# Patient Record
Sex: FEMALE | Race: WHITE | Employment: OTHER | URBAN - METROPOLITAN AREA
[De-identification: names, ages, dates, MRNs, and addresses within clinical notes are randomized per-mention and may not be internally consistent; named-entity substitution may affect disease eponyms.]

---

## 2021-05-20 ENCOUNTER — OFFICE VISIT (OUTPATIENT)
Dept: PRIMARY CARE CLINIC | Age: 76
End: 2021-05-20
Payer: MEDICARE

## 2021-05-20 DIAGNOSIS — Z01.818 PRE-OP EXAMINATION: Primary | ICD-10-CM

## 2021-05-20 LAB — SARS-COV-2: NOT DETECTED

## 2021-05-20 PROCEDURE — 99421 OL DIG E/M SVC 5-10 MIN: CPT | Performed by: NURSE PRACTITIONER

## 2021-05-20 RX ORDER — PROMETHAZINE HYDROCHLORIDE 25 MG/1
1 TABLET ORAL DAILY
Status: ON HOLD | COMMUNITY
End: 2021-05-28 | Stop reason: SDUPTHER

## 2021-05-20 RX ORDER — ASCORBIC ACID 500 MG
500 TABLET ORAL
COMMUNITY

## 2021-05-20 RX ORDER — CHLORAL HYDRATE 500 MG
1 CAPSULE ORAL
Status: ON HOLD | COMMUNITY
End: 2021-05-28 | Stop reason: SDUPTHER

## 2021-05-20 RX ORDER — ASPIRIN 81 MG/1
81 TABLET ORAL DAILY
Status: ON HOLD | COMMUNITY
End: 2021-05-28 | Stop reason: SDUPTHER

## 2021-05-20 RX ORDER — MELATONIN 10 MG
CAPSULE ORAL
COMMUNITY

## 2021-05-20 RX ORDER — OMEPRAZOLE 20 MG/1
20 CAPSULE, DELAYED RELEASE ORAL
COMMUNITY
Start: 2020-12-22

## 2021-05-20 RX ORDER — SIMVASTATIN 40 MG
40 TABLET ORAL
COMMUNITY
Start: 2020-12-22

## 2021-05-20 RX ORDER — CELECOXIB 200 MG/1
200 CAPSULE ORAL 2 TIMES DAILY
Status: ON HOLD | COMMUNITY
End: 2021-05-28 | Stop reason: HOSPADM

## 2021-05-20 NOTE — PROGRESS NOTES
[facility] patients having surgery or anesthesia are required to be Covid tested OR to have been vaccinated at least 14 days prior to your procedure. It is very important to return our call to [phone] to notify the staff of your last vaccination date or you will be required to complete Covid testing within the 5-6 days prior to surgery & quarantine. We recommend coming to the [facility] flu tent to complete. It is open Monday-Friday 8am-3pm currently. If you go outside [facility] you need to ensure you have a PCR Covid test and fax results to PreAdmission Testing at [phone]. PRIOR TO PROCEDURE DATE:        1. PLEASE FOLLOW ANY  GUIDELINES/ INSTRUCTIONS PRIOR TO YOUR PROCEDURE AS ADVISED BY YOUR SURGEON. 2. Arrange for someone to drive you home and be with you for the first 24 hours after discharge for your safety after your procedure for which you received sedation. Ensure it is someone we can share information with regarding your discharge. 3. You must contact your surgeon for instructions IF:   You are taking any blood thinners, aspirin, anti-inflammatory or vitamin E.   There is a change in your physical condition such as a cold, fever, rash, cuts, sores or any other infection, especially near your surgical site. 4. Do not drink alcohol the day before or day of your procedure. 5. A Pre-op History and Physical for surgery MUST be completed by your Physician or Urgent Care within 30 days of your procedure date. Please bring a copy with you on the day of your procedure and along with any other testing performed. THE DAY OF YOUR PROCEDURE:  1. Follow instructions for ARRIVAL TIME as DIRECTED BY YOUR SURGEON. 2. Enter the MAIN entrance from [address] and follow the signs to the free [facility] or [facility] parking (offered free of charge 6am-5pm).             3. Enter the Main Entrance of the hospital (do not enter from the lower level of the parking garage). Upon entrance, check in with the  at the main desk on your left. If no one is available at the desk, proceed into the [facility] Waiting Room and go through the door directly into the [facility]. There is a Check-in desk ACROSS from Room 5 (marked with a sign hanging from the ceiling). The phone number for the surgery center is [phone]. 4. Please call [phone] option #2 option #2 if you have not been preregistered yet. On the day of your procedure bring your insurance card and photo ID. You will be registered at your bedside once brought back to your room. 5. DO NOT EAT ANYTHING eight hours prior to your arrival for surgery. May have 8 ounces of water 4 hours prior to your arrival for surgery. NOTE: ALL Gastric, Bariatric and Bowel surgery patients MUST follow their surgeon's instructions. 6. MEDICATIONS    Take the following medications with a SMALL sip of water:    Use your usual dose of inhalers the morning of surgery. BRING your rescue inhaler with you to hospital.    Anesthesia does NOT want you to take insulin the morning of surgery. They will control your blood sugar while you are at the hospital. Please contact your ordering physician for instructions regarding your insulin the night before your procedure. If you have an insulin pump, please keep it set on basal rate. 7. Do not swallow water when brushing teeth. No gum, candy, mints or ice chips. Refrain from smoking or at least decrease the amount. 8. Dress in loose, comfortable clothing appropriate for redressing after your procedure. Do not wear jewelry (including body piercings), make-up (especially NO eye make-up), fingernail polish (NO toenail polish if foot/leg surgery), lotion, powders or metal hairclips. 9. Dentures, glasses, or contacts will need to be removed before your procedure.  Bring cases for your glasses, contacts, dentures, or hearing aids to protect them while you are in surgery. 10. If you use a CPAP, please bring it with you on the day of your procedure. 11. We recommend that valuable personal  belongings such as cash, cell phones, e-tablets or jewelry, be left at home during your stay. The hospital will not be responsible for valuables that are not secured in the hospital safe. However, if your insurance requires a co-pay, you may want to bring a method of payment, i.e. Check or credit card, if you wish to pay your co-pay the day of surgery. 12. If you are to stay overnight, you may bring a bag with personal items. Please have any large items you may need brought in by your family after your arrival to your hospital room. 15. If you have a Living Will or Durable Power of , please bring a copy on the day of your procedure. 15. With your permission, one family member may accompany you while you are being prepared for surgery. Once you are ready, additional family members may join you. HOW WE KEEP YOU SAFE and WORK TO PREVENT SURGICAL SITE INFECTIONS:  1. Health care workers should always check your ID bracelet to verify your name and birth date. You will be asked many times to state your name, date of birth, and allergies. 2. Health care workers should always clean their hands with soap or alcohol gel before providing care to you. It is okay to ask anyone if they cleaned their hands before they touch you. 3. You will be actively involved in verifying the type of procedure you are having and ensuring the correct surgical site. This will be confirmed multiple times prior to your procedure. Do NOT fatemeh your surgery site UNLESS instructed to by your surgeon. 4. Do not shave or wax for 72 hours prior to procedure near your operative site. Shaving with a razor can irritate your skin and make it easier to develop an infection.  On the day of your procedure, any hair that needs to be removed near the surgical site will be clipped by a healthcare worker using a special clippers designed to avoid skin irritation. 5. When you are in the operating room, your surgical site will be cleansed with a special soap, and in most cases, you will be given an antibiotic before the surgery begins. What to expect AFTER YOUR PROCEDURE:  1. Immediately following your procedure, your will be taken to the PACU for the first phase of your recovery. Your nurse will help you recover from any potential side effects of anesthesia, such as extreme drowsiness, changes in your vital signs or breathing patterns. Nausea, headache, muscle aches, or sore throat may also occur after anesthesia. Your nurse will help you manage these potential side effects. 2. For comfort and safety, arrange to have someone at home with you for the first 24 hours after discharge. 3. You and your family will be given written instructions about your diet, activity, dressing care, medications, and return visits. 4. Once at home, should issues with nausea, pain, or bleeding occur, or should you notice any signs of infection, you should call your surgeon. 5. Always clean your hands before and after caring for your wound. Do not let your family touch your surgery site without cleaning their hands. 6. Narcotic pain medications can cause significant constipation. You may want to add a stool softener to your postoperative medication schedule or speak to your surgeon on how best to manage this SIDE EFFECT. SPECIAL INSTRUCTIONS     Thank you for allowing us to care for you. We strive to exceed your expectations in the delivery of care and service provided to you and your family. If you need to contact the [place] [address] staff for any reason, please call us at [phone]    Instructions reviewed with patient during preadmission testing phone interview.   Craig Doss RN.5/20/2021 .3:40 PM      ADDITIONAL EDUCATIONAL INFORMATION REVIEWED PER PHONE WITH YOU AND/OR YOUR FAMILY:  Yes Hibiclens® Bathing Instructions

## 2021-05-25 ENCOUNTER — ANESTHESIA EVENT (OUTPATIENT)
Dept: OPERATING ROOM | Age: 76
DRG: 454 | End: 2021-05-25
Payer: MEDICARE

## 2021-05-25 NOTE — PROGRESS NOTES
[facility] / [facility]) [address] [facility], [address]    Acknowledgment of Informed Consent for Surgical or Medical Procedure and Sedation  I agree to allow doctor(s) Niki Cohen and  Amber Santa and his/her associates or assistants, including residents and/or other qualified medical practitioner to perform the following medical treatment or procedure and to administer or direct the administration of sedation as necessary:  Procedure(s): L3-4, L4-5 ANTERIOR LUMBAR INTERBODY FUSION WITH POSTERIOR LUMBAR L3-4. L4-5 DECOMPRESSION FUSION FIXATION USING PARAMEDIAN TECHNIQUES    My doctor has explained the following regarding the proposed procedure:   the explanation of the procedure   the benefits of the procedure   the potential problems that might occur during recuperation   the risks and side effects of the procedure which could include but are not limited to severe blood loss, infection, stroke or death   the benefits, risks and side effect of alternative procedures including the consequences of declining this procedure or any alternative procedures   the likelihood of achieving satisfactory results. I acknowledge no guarantee or assurance has been made to me regarding the results. I understand that during the course of this treatment/procedure, unforeseen conditions can occur which require an additional or different procedure. I agree to allow my physician or assistants to perform such extension of the original procedure as they may find necessary. I understand that sedation will often result in temporary impairment of memory and fine motor skills and that sedation can occasionally progress to a state of deep sedation or general anesthesia.     I understand the risks of anesthesia for surgery include, but are not limited to, sore throat, hoarseness, injury to face, mouth, or teeth; nausea; headache; injury to blood vessels or nerves; death, brain damage, or paralysis. I understand that if I have a Limitation of Treatment order in effect during my hospitalization, the order may or may not be in effect during this procedure. I give my doctor permission to give me blood or blood products. I understand that there are risks with receiving blood such as hepatitis, AIDS, fever, or allergic reaction. I acknowledge that the risks, benefits, and alternatives of this treatment have been explained to me and that no express or implied warranty has been given by the hospital, any blood bank, or any person or entity as to the blood or blood components transfused. At the discretion of my doctor, I agree to allow observers, equipment/product representatives and allow photographing, and/or televising of the procedure, provided my name or identity is maintained confidentially. I agree the hospital may dispose of or use for scientific or educational purposes any tissue, fluid, or body parts which may be removed.     ________________________________Date________Time______ am/pm  ([place] [facility])  Patient or Signature of Closest Relative or Legal Guardian    ________________________________Date________Time______am/pm      Page 1 of  1  Witness

## 2021-05-26 ENCOUNTER — ANESTHESIA (OUTPATIENT)
Dept: OPERATING ROOM | Age: 76
DRG: 454 | End: 2021-05-26
Payer: MEDICARE

## 2021-05-26 ENCOUNTER — APPOINTMENT (OUTPATIENT)
Dept: GENERAL RADIOLOGY | Age: 76
DRG: 454 | End: 2021-05-26
Attending: NEUROLOGICAL SURGERY
Payer: MEDICARE

## 2021-05-26 ENCOUNTER — HOSPITAL ENCOUNTER (INPATIENT)
Age: 76
LOS: 3 days | Discharge: HOME | DRG: 454 | End: 2021-05-29
Attending: NEUROLOGICAL SURGERY | Admitting: NEUROLOGICAL SURGERY
Payer: MEDICARE

## 2021-05-26 VITALS
RESPIRATION RATE: 6 BRPM | TEMPERATURE: 94.8 F | DIASTOLIC BLOOD PRESSURE: 82 MMHG | SYSTOLIC BLOOD PRESSURE: 162 MMHG | OXYGEN SATURATION: 100 %

## 2021-05-26 DIAGNOSIS — M48.062 LUMBAR STENOSIS WITH NEUROGENIC CLAUDICATION: Primary | ICD-10-CM

## 2021-05-26 DIAGNOSIS — Z98.1 S/P LUMBAR FUSION: ICD-10-CM

## 2021-05-26 PROCEDURE — C1713 ANCHOR/SCREW BN/BN,TIS/BN: HCPCS | Performed by: NEUROLOGICAL SURGERY

## 2021-05-26 PROCEDURE — 2500000003 HC RX 250 WO HCPCS: Performed by: NURSE ANESTHETIST, CERTIFIED REGISTERED

## 2021-05-26 PROCEDURE — 2580000003 HC RX 258: Performed by: NEUROLOGICAL SURGERY

## 2021-05-26 PROCEDURE — 2580000003 HC RX 258: Performed by: NURSE ANESTHETIST, CERTIFIED REGISTERED

## 2021-05-26 PROCEDURE — 3600000004 HC SURGERY LEVEL 4 BASE: Performed by: NEUROLOGICAL SURGERY

## 2021-05-26 PROCEDURE — 6370000000 HC RX 637 (ALT 250 FOR IP): Performed by: ANESTHESIOLOGY

## 2021-05-26 PROCEDURE — 7100000001 HC PACU RECOVERY - ADDTL 15 MIN: Performed by: NEUROLOGICAL SURGERY

## 2021-05-26 PROCEDURE — 3209999900 FLUORO FOR SURGICAL PROCEDURES

## 2021-05-26 PROCEDURE — 2580000003 HC RX 258: Performed by: PHYSICIAN ASSISTANT

## 2021-05-26 PROCEDURE — 3700000001 HC ADD 15 MINUTES (ANESTHESIA): Performed by: NEUROLOGICAL SURGERY

## 2021-05-26 PROCEDURE — 0SB20ZZ EXCISION OF LUMBAR VERTEBRAL DISC, OPEN APPROACH: ICD-10-PCS | Performed by: NEUROLOGICAL SURGERY

## 2021-05-26 PROCEDURE — 87641 MR-STAPH DNA AMP PROBE: CPT

## 2021-05-26 PROCEDURE — 6370000000 HC RX 637 (ALT 250 FOR IP): Performed by: PHYSICIAN ASSISTANT

## 2021-05-26 PROCEDURE — 2500000003 HC RX 250 WO HCPCS: Performed by: NEUROLOGICAL SURGERY

## 2021-05-26 PROCEDURE — 3700000000 HC ANESTHESIA ATTENDED CARE: Performed by: NEUROLOGICAL SURGERY

## 2021-05-26 PROCEDURE — 6360000002 HC RX W HCPCS: Performed by: NEUROLOGICAL SURGERY

## 2021-05-26 PROCEDURE — 0SG1071 FUSION OF 2 OR MORE LUMBAR VERTEBRAL JOINTS WITH AUTOLOGOUS TISSUE SUBSTITUTE, POSTERIOR APPROACH, POSTERIOR COLUMN, OPEN APPROACH: ICD-10-PCS | Performed by: NEUROLOGICAL SURGERY

## 2021-05-26 PROCEDURE — 6360000002 HC RX W HCPCS: Performed by: PHYSICIAN ASSISTANT

## 2021-05-26 PROCEDURE — 7100000000 HC PACU RECOVERY - FIRST 15 MIN: Performed by: NEUROLOGICAL SURGERY

## 2021-05-26 PROCEDURE — 01NB0ZZ RELEASE LUMBAR NERVE, OPEN APPROACH: ICD-10-PCS | Performed by: NEUROLOGICAL SURGERY

## 2021-05-26 PROCEDURE — 2780000010 HC IMPLANT OTHER: Performed by: NEUROLOGICAL SURGERY

## 2021-05-26 PROCEDURE — 3600000014 HC SURGERY LEVEL 4 ADDTL 15MIN: Performed by: NEUROLOGICAL SURGERY

## 2021-05-26 PROCEDURE — 07DR0ZZ EXTRACTION OF ILIAC BONE MARROW, OPEN APPROACH: ICD-10-PCS | Performed by: NEUROLOGICAL SURGERY

## 2021-05-26 PROCEDURE — 6360000002 HC RX W HCPCS: Performed by: NURSE ANESTHETIST, CERTIFIED REGISTERED

## 2021-05-26 PROCEDURE — 6360000002 HC RX W HCPCS: Performed by: ANESTHESIOLOGY

## 2021-05-26 PROCEDURE — 2580000003 HC RX 258: Performed by: ANESTHESIOLOGY

## 2021-05-26 PROCEDURE — 0SG10A0 FUSION OF 2 OR MORE LUMBAR VERTEBRAL JOINTS WITH INTERBODY FUSION DEVICE, ANTERIOR APPROACH, ANTERIOR COLUMN, OPEN APPROACH: ICD-10-PCS | Performed by: NEUROLOGICAL SURGERY

## 2021-05-26 PROCEDURE — C9290 INJ, BUPIVACAINE LIPOSOME: HCPCS | Performed by: NEUROLOGICAL SURGERY

## 2021-05-26 PROCEDURE — 2709999900 HC NON-CHARGEABLE SUPPLY: Performed by: NEUROLOGICAL SURGERY

## 2021-05-26 PROCEDURE — 72100 X-RAY EXAM L-S SPINE 2/3 VWS: CPT

## 2021-05-26 PROCEDURE — 1200000000 HC SEMI PRIVATE

## 2021-05-26 PROCEDURE — 2720000010 HC SURG SUPPLY STERILE: Performed by: NEUROLOGICAL SURGERY

## 2021-05-26 PROCEDURE — C9359 IMPLNT,BON VOID FILLER-PUTTY: HCPCS | Performed by: NEUROLOGICAL SURGERY

## 2021-05-26 DEVICE — ROD SPNL L70MM DIA5.5MM POST PEDCL TI LORDOSED VIPER 2: Type: IMPLANTABLE DEVICE | Site: SPINE LUMBAR | Status: FUNCTIONAL

## 2021-05-26 DEVICE — DBX PUTTY, 5CC
Type: IMPLANTABLE DEVICE | Site: SPINE LUMBAR | Status: FUNCTIONAL
Brand: DBX®

## 2021-05-26 DEVICE — BONE GRAFT KIT 7510200 INFUSE SMALL
Type: IMPLANTABLE DEVICE | Site: SPINE LUMBAR | Status: FUNCTIONAL
Brand: INFUSE® BONE GRAFT

## 2021-05-26 DEVICE — BOWTI ANTERIOR BUTTRESS STAPLE BONE SCREW 5.70 X 25MM
Type: IMPLANTABLE DEVICE | Site: SPINE LUMBAR | Status: FUNCTIONAL
Brand: BOWTI

## 2021-05-26 DEVICE — WASHER SPINAL TITANIUM 16 MM 6 MM: Type: IMPLANTABLE DEVICE | Site: SPINE LUMBAR | Status: FUNCTIONAL

## 2021-05-26 DEVICE — Z DUP USE 2254824 SCREW SET SINGLE INNIE 2 MINIMALLY INVASIVE SURG TI VIPER 2: Type: IMPLANTABLE DEVICE | Site: SPINE LUMBAR | Status: FUNCTIONAL

## 2021-05-26 DEVICE — IMPLANTABLE DEVICE: Type: IMPLANTABLE DEVICE | Site: SPINE LUMBAR | Status: FUNCTIONAL

## 2021-05-26 DEVICE — BONE GRFT SUB L 12.5CC BIOACTIVE GLS MTRX: Type: IMPLANTABLE DEVICE | Site: SPINE LUMBAR | Status: FUNCTIONAL

## 2021-05-26 DEVICE — BONE GRAFT KIT 7510400 INFUSE MEDIUM
Type: IMPLANTABLE DEVICE | Site: SPINE LUMBAR | Status: FUNCTIONAL
Brand: INFUSE® BONE GRAFT

## 2021-05-26 DEVICE — SCREW SPNL L45MM OD6MM CORT FIX TI POLYAX FEN EXT TAB MIS: Type: IMPLANTABLE DEVICE | Site: SPINE LUMBAR | Status: FUNCTIONAL

## 2021-05-26 RX ORDER — PROPOFOL 10 MG/ML
INJECTION, EMULSION INTRAVENOUS CONTINUOUS PRN
Status: DISCONTINUED | OUTPATIENT
Start: 2021-05-26 | End: 2021-05-26 | Stop reason: SDUPTHER

## 2021-05-26 RX ORDER — SODIUM CHLORIDE 0.9 % (FLUSH) 0.9 %
10 SYRINGE (ML) INJECTION EVERY 12 HOURS SCHEDULED
Status: DISCONTINUED | OUTPATIENT
Start: 2021-05-26 | End: 2021-05-26 | Stop reason: HOSPADM

## 2021-05-26 RX ORDER — MEPERIDINE HYDROCHLORIDE 25 MG/ML
12.5 INJECTION INTRAMUSCULAR; INTRAVENOUS; SUBCUTANEOUS EVERY 5 MIN PRN
Status: DISCONTINUED | OUTPATIENT
Start: 2021-05-26 | End: 2021-05-26 | Stop reason: HOSPADM

## 2021-05-26 RX ORDER — SODIUM PHOSPHATE, DIBASIC AND SODIUM PHOSPHATE, MONOBASIC 7; 19 G/133ML; G/133ML
1 ENEMA RECTAL DAILY PRN
Status: DISCONTINUED | OUTPATIENT
Start: 2021-05-26 | End: 2021-05-29 | Stop reason: HOSPADM

## 2021-05-26 RX ORDER — HYDROMORPHONE HCL 110MG/55ML
PATIENT CONTROLLED ANALGESIA SYRINGE INTRAVENOUS PRN
Status: DISCONTINUED | OUTPATIENT
Start: 2021-05-26 | End: 2021-05-26 | Stop reason: SDUPTHER

## 2021-05-26 RX ORDER — FENTANYL CITRATE 50 UG/ML
INJECTION, SOLUTION INTRAMUSCULAR; INTRAVENOUS PRN
Status: DISCONTINUED | OUTPATIENT
Start: 2021-05-26 | End: 2021-05-26 | Stop reason: SDUPTHER

## 2021-05-26 RX ORDER — MAGNESIUM SULFATE HEPTAHYDRATE 500 MG/ML
INJECTION, SOLUTION INTRAMUSCULAR; INTRAVENOUS PRN
Status: DISCONTINUED | OUTPATIENT
Start: 2021-05-26 | End: 2021-05-26 | Stop reason: SDUPTHER

## 2021-05-26 RX ORDER — ROCURONIUM BROMIDE 10 MG/ML
INJECTION, SOLUTION INTRAVENOUS PRN
Status: DISCONTINUED | OUTPATIENT
Start: 2021-05-26 | End: 2021-05-26 | Stop reason: SDUPTHER

## 2021-05-26 RX ORDER — OXYCODONE HYDROCHLORIDE AND ACETAMINOPHEN 5; 325 MG/1; MG/1
1-2 TABLET ORAL EVERY 4 HOURS PRN
Qty: 84 TABLET | Refills: 0 | Status: SHIPPED | OUTPATIENT
Start: 2021-05-26 | End: 2021-06-02

## 2021-05-26 RX ORDER — VANCOMYCIN HYDROCHLORIDE 1 G/20ML
INJECTION, POWDER, LYOPHILIZED, FOR SOLUTION INTRAVENOUS PRN
Status: DISCONTINUED | OUTPATIENT
Start: 2021-05-26 | End: 2021-05-26 | Stop reason: HOSPADM

## 2021-05-26 RX ORDER — LIDOCAINE HCL/PF 100 MG/5ML
SYRINGE (ML) INJECTION PRN
Status: DISCONTINUED | OUTPATIENT
Start: 2021-05-26 | End: 2021-05-26 | Stop reason: SDUPTHER

## 2021-05-26 RX ORDER — CEFAZOLIN SODIUM 2 G/50ML
2000 SOLUTION INTRAVENOUS EVERY 8 HOURS
Status: COMPLETED | OUTPATIENT
Start: 2021-05-26 | End: 2021-05-27

## 2021-05-26 RX ORDER — ATORVASTATIN CALCIUM 20 MG/1
20 TABLET, FILM COATED ORAL DAILY
Status: DISCONTINUED | OUTPATIENT
Start: 2021-05-26 | End: 2021-05-29 | Stop reason: HOSPADM

## 2021-05-26 RX ORDER — SENNA AND DOCUSATE SODIUM 50; 8.6 MG/1; MG/1
1 TABLET, FILM COATED ORAL 2 TIMES DAILY
Status: DISCONTINUED | OUTPATIENT
Start: 2021-05-26 | End: 2021-05-29 | Stop reason: HOSPADM

## 2021-05-26 RX ORDER — SCOLOPAMINE TRANSDERMAL SYSTEM 1 MG/1
1 PATCH, EXTENDED RELEASE TRANSDERMAL ONCE
Status: COMPLETED | OUTPATIENT
Start: 2021-05-26 | End: 2021-05-29

## 2021-05-26 RX ORDER — REMIFENTANIL HYDROCHLORIDE 1 MG/ML
INJECTION, POWDER, LYOPHILIZED, FOR SOLUTION INTRAVENOUS CONTINUOUS PRN
Status: DISCONTINUED | OUTPATIENT
Start: 2021-05-26 | End: 2021-05-26 | Stop reason: SDUPTHER

## 2021-05-26 RX ORDER — PHENYLEPHRINE HYDROCHLORIDE 10 MG/ML
INJECTION INTRAVENOUS PRN
Status: DISCONTINUED | OUTPATIENT
Start: 2021-05-26 | End: 2021-05-26 | Stop reason: SDUPTHER

## 2021-05-26 RX ORDER — ONDANSETRON 2 MG/ML
4 INJECTION INTRAMUSCULAR; INTRAVENOUS EVERY 6 HOURS PRN
Status: DISCONTINUED | OUTPATIENT
Start: 2021-05-26 | End: 2021-05-29 | Stop reason: HOSPADM

## 2021-05-26 RX ORDER — GLYCOPYRROLATE 1 MG/5 ML
SYRINGE (ML) INTRAVENOUS PRN
Status: DISCONTINUED | OUTPATIENT
Start: 2021-05-26 | End: 2021-05-26 | Stop reason: SDUPTHER

## 2021-05-26 RX ORDER — DEXAMETHASONE SODIUM PHOSPHATE 4 MG/ML
INJECTION, SOLUTION INTRA-ARTICULAR; INTRALESIONAL; INTRAMUSCULAR; INTRAVENOUS; SOFT TISSUE PRN
Status: DISCONTINUED | OUTPATIENT
Start: 2021-05-26 | End: 2021-05-26 | Stop reason: SDUPTHER

## 2021-05-26 RX ORDER — ONDANSETRON 4 MG/1
4 TABLET, ORALLY DISINTEGRATING ORAL 3 TIMES DAILY PRN
Qty: 21 TABLET | Refills: 0 | Status: SHIPPED | OUTPATIENT
Start: 2021-05-26 | End: 2021-06-26

## 2021-05-26 RX ORDER — HEPARIN SODIUM 5000 [USP'U]/ML
5000 INJECTION, SOLUTION INTRAVENOUS; SUBCUTANEOUS EVERY 8 HOURS SCHEDULED
Status: DISCONTINUED | OUTPATIENT
Start: 2021-05-27 | End: 2021-05-29 | Stop reason: HOSPADM

## 2021-05-26 RX ORDER — SODIUM CHLORIDE 0.9 % (FLUSH) 0.9 %
10 SYRINGE (ML) INJECTION PRN
Status: DISCONTINUED | OUTPATIENT
Start: 2021-05-26 | End: 2021-05-29 | Stop reason: HOSPADM

## 2021-05-26 RX ORDER — ACETAMINOPHEN 325 MG/1
650 TABLET ORAL EVERY 6 HOURS
Status: DISCONTINUED | OUTPATIENT
Start: 2021-05-26 | End: 2021-05-27

## 2021-05-26 RX ORDER — OXYCODONE HYDROCHLORIDE 5 MG/1
5 TABLET ORAL EVERY 4 HOURS PRN
Status: DISCONTINUED | OUTPATIENT
Start: 2021-05-26 | End: 2021-05-29 | Stop reason: HOSPADM

## 2021-05-26 RX ORDER — PROMETHAZINE HYDROCHLORIDE 12.5 MG/1
12.5 TABLET ORAL EVERY 6 HOURS PRN
Status: DISCONTINUED | OUTPATIENT
Start: 2021-05-26 | End: 2021-05-29 | Stop reason: HOSPADM

## 2021-05-26 RX ORDER — PROMETHAZINE HYDROCHLORIDE 25 MG/ML
6.25 INJECTION, SOLUTION INTRAMUSCULAR; INTRAVENOUS
Status: DISCONTINUED | OUTPATIENT
Start: 2021-05-26 | End: 2021-05-26 | Stop reason: HOSPADM

## 2021-05-26 RX ORDER — LABETALOL HYDROCHLORIDE 5 MG/ML
5 INJECTION, SOLUTION INTRAVENOUS EVERY 10 MIN PRN
Status: DISCONTINUED | OUTPATIENT
Start: 2021-05-26 | End: 2021-05-26 | Stop reason: HOSPADM

## 2021-05-26 RX ORDER — DOCUSATE SODIUM 100 MG/1
100 CAPSULE, LIQUID FILLED ORAL 2 TIMES DAILY
Qty: 60 CAPSULE | Refills: 0 | Status: SHIPPED | OUTPATIENT
Start: 2021-05-26 | End: 2021-06-25

## 2021-05-26 RX ORDER — PROPOFOL 10 MG/ML
INJECTION, EMULSION INTRAVENOUS PRN
Status: DISCONTINUED | OUTPATIENT
Start: 2021-05-26 | End: 2021-05-26 | Stop reason: SDUPTHER

## 2021-05-26 RX ORDER — SODIUM CHLORIDE, SODIUM LACTATE, POTASSIUM CHLORIDE, CALCIUM CHLORIDE 600; 310; 30; 20 MG/100ML; MG/100ML; MG/100ML; MG/100ML
INJECTION, SOLUTION INTRAVENOUS CONTINUOUS
Status: DISCONTINUED | OUTPATIENT
Start: 2021-05-26 | End: 2021-05-26

## 2021-05-26 RX ORDER — CEFAZOLIN SODIUM 2 G/50ML
2000 SOLUTION INTRAVENOUS ONCE
Status: COMPLETED | OUTPATIENT
Start: 2021-05-26 | End: 2021-05-26

## 2021-05-26 RX ORDER — LIDOCAINE HYDROCHLORIDE ANHYDROUS AND DEXTROSE MONOHYDRATE .4; 5 G/100ML; G/100ML
INJECTION, SOLUTION INTRAVENOUS CONTINUOUS PRN
Status: DISCONTINUED | OUTPATIENT
Start: 2021-05-26 | End: 2021-05-26 | Stop reason: SDUPTHER

## 2021-05-26 RX ORDER — FENTANYL CITRATE 50 UG/ML
25 INJECTION, SOLUTION INTRAMUSCULAR; INTRAVENOUS EVERY 5 MIN PRN
Status: DISCONTINUED | OUTPATIENT
Start: 2021-05-26 | End: 2021-05-26 | Stop reason: HOSPADM

## 2021-05-26 RX ORDER — HEPARIN SODIUM 5000 [USP'U]/.5ML
5000 INJECTION, SOLUTION INTRAVENOUS; SUBCUTANEOUS EVERY 8 HOURS
Status: DISCONTINUED | OUTPATIENT
Start: 2021-05-27 | End: 2021-05-26 | Stop reason: CLARIF

## 2021-05-26 RX ORDER — FENTANYL CITRATE 50 UG/ML
50 INJECTION, SOLUTION INTRAMUSCULAR; INTRAVENOUS EVERY 5 MIN PRN
Status: DISCONTINUED | OUTPATIENT
Start: 2021-05-26 | End: 2021-05-26 | Stop reason: HOSPADM

## 2021-05-26 RX ORDER — MIDAZOLAM HYDROCHLORIDE 1 MG/ML
INJECTION INTRAMUSCULAR; INTRAVENOUS PRN
Status: DISCONTINUED | OUTPATIENT
Start: 2021-05-26 | End: 2021-05-26 | Stop reason: SDUPTHER

## 2021-05-26 RX ORDER — MECOBALAMIN 5000 MCG
10 TABLET,DISINTEGRATING ORAL NIGHTLY PRN
Status: DISCONTINUED | OUTPATIENT
Start: 2021-05-26 | End: 2021-05-29 | Stop reason: HOSPADM

## 2021-05-26 RX ORDER — METHOCARBAMOL 750 MG/1
750 TABLET, FILM COATED ORAL 4 TIMES DAILY PRN
Status: DISCONTINUED | OUTPATIENT
Start: 2021-05-27 | End: 2021-05-27

## 2021-05-26 RX ORDER — ONDANSETRON 2 MG/ML
4 INJECTION INTRAMUSCULAR; INTRAVENOUS
Status: DISCONTINUED | OUTPATIENT
Start: 2021-05-26 | End: 2021-05-26 | Stop reason: HOSPADM

## 2021-05-26 RX ORDER — OXYCODONE HYDROCHLORIDE 5 MG/1
10 TABLET ORAL EVERY 4 HOURS PRN
Status: DISCONTINUED | OUTPATIENT
Start: 2021-05-26 | End: 2021-05-29 | Stop reason: HOSPADM

## 2021-05-26 RX ORDER — PANTOPRAZOLE SODIUM 40 MG/1
40 TABLET, DELAYED RELEASE ORAL
Status: DISCONTINUED | OUTPATIENT
Start: 2021-05-27 | End: 2021-05-29 | Stop reason: HOSPADM

## 2021-05-26 RX ORDER — POLYETHYLENE GLYCOL 3350 17 G/17G
17 POWDER, FOR SOLUTION ORAL DAILY PRN
Status: DISCONTINUED | OUTPATIENT
Start: 2021-05-26 | End: 2021-05-27

## 2021-05-26 RX ORDER — OXYCODONE HYDROCHLORIDE AND ACETAMINOPHEN 5; 325 MG/1; MG/1
1 TABLET ORAL
Status: DISCONTINUED | OUTPATIENT
Start: 2021-05-26 | End: 2021-05-26 | Stop reason: HOSPADM

## 2021-05-26 RX ORDER — SODIUM CHLORIDE 0.9 % (FLUSH) 0.9 %
10 SYRINGE (ML) INJECTION EVERY 12 HOURS SCHEDULED
Status: DISCONTINUED | OUTPATIENT
Start: 2021-05-26 | End: 2021-05-29 | Stop reason: HOSPADM

## 2021-05-26 RX ORDER — SODIUM CHLORIDE, SODIUM LACTATE, POTASSIUM CHLORIDE, CALCIUM CHLORIDE 600; 310; 30; 20 MG/100ML; MG/100ML; MG/100ML; MG/100ML
INJECTION, SOLUTION INTRAVENOUS CONTINUOUS
Status: DISCONTINUED | OUTPATIENT
Start: 2021-05-26 | End: 2021-05-27

## 2021-05-26 RX ORDER — SUCCINYLCHOLINE/SOD CL,ISO/PF 200MG/10ML
SYRINGE (ML) INTRAVENOUS PRN
Status: DISCONTINUED | OUTPATIENT
Start: 2021-05-26 | End: 2021-05-26 | Stop reason: SDUPTHER

## 2021-05-26 RX ORDER — METHOCARBAMOL 500 MG/1
500-1000 TABLET, FILM COATED ORAL EVERY 6 HOURS PRN
Qty: 80 TABLET | Refills: 1 | Status: SHIPPED | OUTPATIENT
Start: 2021-05-26

## 2021-05-26 RX ORDER — HYDRALAZINE HYDROCHLORIDE 20 MG/ML
5 INJECTION INTRAMUSCULAR; INTRAVENOUS EVERY 10 MIN PRN
Status: DISCONTINUED | OUTPATIENT
Start: 2021-05-26 | End: 2021-05-26 | Stop reason: HOSPADM

## 2021-05-26 RX ORDER — SODIUM CHLORIDE 9 MG/ML
25 INJECTION, SOLUTION INTRAVENOUS PRN
Status: DISCONTINUED | OUTPATIENT
Start: 2021-05-26 | End: 2021-05-26 | Stop reason: HOSPADM

## 2021-05-26 RX ORDER — SODIUM CHLORIDE 0.9 % (FLUSH) 0.9 %
10 SYRINGE (ML) INJECTION PRN
Status: DISCONTINUED | OUTPATIENT
Start: 2021-05-26 | End: 2021-05-26 | Stop reason: HOSPADM

## 2021-05-26 RX ORDER — SODIUM CHLORIDE 9 MG/ML
25 INJECTION, SOLUTION INTRAVENOUS PRN
Status: DISCONTINUED | OUTPATIENT
Start: 2021-05-26 | End: 2021-05-29 | Stop reason: HOSPADM

## 2021-05-26 RX ORDER — SODIUM CHLORIDE, SODIUM LACTATE, POTASSIUM CHLORIDE, CALCIUM CHLORIDE 600; 310; 30; 20 MG/100ML; MG/100ML; MG/100ML; MG/100ML
INJECTION, SOLUTION INTRAVENOUS CONTINUOUS PRN
Status: DISCONTINUED | OUTPATIENT
Start: 2021-05-26 | End: 2021-05-26 | Stop reason: SDUPTHER

## 2021-05-26 RX ORDER — ONDANSETRON 2 MG/ML
INJECTION INTRAMUSCULAR; INTRAVENOUS PRN
Status: DISCONTINUED | OUTPATIENT
Start: 2021-05-26 | End: 2021-05-26 | Stop reason: SDUPTHER

## 2021-05-26 RX ADMIN — ROCURONIUM BROMIDE 50 MG: 10 INJECTION INTRAVENOUS at 13:56

## 2021-05-26 RX ADMIN — PROPOFOL 250 MG: 10 INJECTION, EMULSION INTRAVENOUS at 13:40

## 2021-05-26 RX ADMIN — CEFAZOLIN SODIUM 2000 MG: 2 SOLUTION INTRAVENOUS at 23:20

## 2021-05-26 RX ADMIN — ACETAMINOPHEN 650 MG: 325 TABLET ORAL at 21:29

## 2021-05-26 RX ADMIN — METHOCARBAMOL 1000 MG: 100 INJECTION, SOLUTION INTRAMUSCULAR; INTRAVENOUS at 17:40

## 2021-05-26 RX ADMIN — HYDROMORPHONE HYDROCHLORIDE 0.25 MG: 1 INJECTION, SOLUTION INTRAMUSCULAR; INTRAVENOUS; SUBCUTANEOUS at 23:18

## 2021-05-26 RX ADMIN — PHENYLEPHRINE HYDROCHLORIDE 80 MCG: 10 INJECTION INTRAVENOUS at 14:30

## 2021-05-26 RX ADMIN — Medication 0.2 MG: at 13:30

## 2021-05-26 RX ADMIN — HYDROMORPHONE HYDROCHLORIDE 1 MG: 2 INJECTION, SOLUTION INTRAMUSCULAR; INTRAVENOUS; SUBCUTANEOUS at 14:18

## 2021-05-26 RX ADMIN — ONDANSETRON 4 MG: 2 INJECTION INTRAMUSCULAR; INTRAVENOUS at 17:04

## 2021-05-26 RX ADMIN — DOCUSATE SODIUM 50 MG AND SENNOSIDES 8.6 MG 1 TABLET: 8.6; 5 TABLET, FILM COATED ORAL at 21:25

## 2021-05-26 RX ADMIN — Medication 10 MG: at 21:25

## 2021-05-26 RX ADMIN — PROPOFOL 100 MCG/KG/MIN: 10 INJECTION, EMULSION INTRAVENOUS at 13:40

## 2021-05-26 RX ADMIN — ATORVASTATIN CALCIUM 20 MG: 20 TABLET, FILM COATED ORAL at 21:25

## 2021-05-26 RX ADMIN — Medication 0.2 MG: at 15:51

## 2021-05-26 RX ADMIN — SUGAMMADEX 200 MG: 100 INJECTION, SOLUTION INTRAVENOUS at 17:04

## 2021-05-26 RX ADMIN — FENTANYL CITRATE 25 MCG: 50 INJECTION INTRAMUSCULAR; INTRAVENOUS at 18:17

## 2021-05-26 RX ADMIN — CEFAZOLIN SODIUM 2000 MG: 2 SOLUTION INTRAVENOUS at 13:56

## 2021-05-26 RX ADMIN — MIDAZOLAM HYDROCHLORIDE 2 MG: 2 INJECTION, SOLUTION INTRAMUSCULAR; INTRAVENOUS at 13:30

## 2021-05-26 RX ADMIN — DEXAMETHASONE SODIUM PHOSPHATE 8 MG: 4 INJECTION, SOLUTION INTRAMUSCULAR; INTRAVENOUS at 14:06

## 2021-05-26 RX ADMIN — FENTANYL CITRATE 50 MCG: 50 INJECTION INTRAMUSCULAR; INTRAVENOUS at 17:43

## 2021-05-26 RX ADMIN — Medication 100 MG: at 13:40

## 2021-05-26 RX ADMIN — HYDROMORPHONE HYDROCHLORIDE 0.6 MG: 2 INJECTION, SOLUTION INTRAMUSCULAR; INTRAVENOUS; SUBCUTANEOUS at 14:02

## 2021-05-26 RX ADMIN — Medication 120 MG: at 13:40

## 2021-05-26 RX ADMIN — SODIUM CHLORIDE, POTASSIUM CHLORIDE, SODIUM LACTATE AND CALCIUM CHLORIDE: 600; 310; 30; 20 INJECTION, SOLUTION INTRAVENOUS at 23:08

## 2021-05-26 RX ADMIN — Medication 10 ML: at 21:26

## 2021-05-26 RX ADMIN — MAGNESIUM SULFATE HEPTAHYDRATE 2 G: 500 INJECTION, SOLUTION INTRAMUSCULAR; INTRAVENOUS at 14:12

## 2021-05-26 RX ADMIN — SODIUM CHLORIDE, SODIUM LACTATE, POTASSIUM CHLORIDE, AND CALCIUM CHLORIDE: .6; .31; .03; .02 INJECTION, SOLUTION INTRAVENOUS at 15:21

## 2021-05-26 RX ADMIN — ROCURONIUM BROMIDE 30 MG: 10 INJECTION INTRAVENOUS at 15:05

## 2021-05-26 RX ADMIN — PROPOFOL 50 MG: 10 INJECTION, EMULSION INTRAVENOUS at 15:40

## 2021-05-26 RX ADMIN — HYDROMORPHONE HYDROCHLORIDE 0.4 MG: 2 INJECTION, SOLUTION INTRAMUSCULAR; INTRAVENOUS; SUBCUTANEOUS at 14:12

## 2021-05-26 RX ADMIN — SODIUM CHLORIDE, POTASSIUM CHLORIDE, SODIUM LACTATE AND CALCIUM CHLORIDE: 600; 310; 30; 20 INJECTION, SOLUTION INTRAVENOUS at 12:23

## 2021-05-26 RX ADMIN — LIDOCAINE HYDROCHLORIDE ANHYDROUS AND DEXTROSE MONOHYDRATE 2 MG/MIN: .4; 5 INJECTION, SOLUTION INTRAVENOUS at 13:40

## 2021-05-26 RX ADMIN — FENTANYL CITRATE 100 MCG: 50 INJECTION, SOLUTION INTRAMUSCULAR; INTRAVENOUS at 13:36

## 2021-05-26 RX ADMIN — HYDRALAZINE HYDROCHLORIDE 5 MG: 20 INJECTION INTRAMUSCULAR; INTRAVENOUS at 17:44

## 2021-05-26 RX ADMIN — PHENYLEPHRINE HYDROCHLORIDE 20 MCG/MIN: 10 INJECTION INTRAVENOUS at 14:39

## 2021-05-26 RX ADMIN — OXYCODONE 10 MG: 5 TABLET ORAL at 21:25

## 2021-05-26 RX ADMIN — HYDRALAZINE HYDROCHLORIDE 5 MG: 20 INJECTION INTRAMUSCULAR; INTRAVENOUS at 17:58

## 2021-05-26 RX ADMIN — SODIUM CHLORIDE, POTASSIUM CHLORIDE, SODIUM LACTATE AND CALCIUM CHLORIDE: 600; 310; 30; 20 INJECTION, SOLUTION INTRAVENOUS at 17:56

## 2021-05-26 RX ADMIN — REMIFENTANIL HYDROCHLORIDE 0.11 MCG/KG/MIN: 1 INJECTION, POWDER, LYOPHILIZED, FOR SOLUTION INTRAVENOUS at 13:40

## 2021-05-26 RX ADMIN — SODIUM CHLORIDE, SODIUM LACTATE, POTASSIUM CHLORIDE, AND CALCIUM CHLORIDE: .6; .31; .03; .02 INJECTION, SOLUTION INTRAVENOUS at 13:35

## 2021-05-26 ASSESSMENT — PULMONARY FUNCTION TESTS
PIF_VALUE: 22
PIF_VALUE: 20
PIF_VALUE: 19
PIF_VALUE: 19
PIF_VALUE: 26
PIF_VALUE: 22
PIF_VALUE: 0
PIF_VALUE: 2
PIF_VALUE: 20
PIF_VALUE: 20
PIF_VALUE: 26
PIF_VALUE: 17
PIF_VALUE: 19
PIF_VALUE: 19
PIF_VALUE: 21
PIF_VALUE: 20
PIF_VALUE: 19
PIF_VALUE: 26
PIF_VALUE: 20
PIF_VALUE: 20
PIF_VALUE: 22
PIF_VALUE: 25
PIF_VALUE: 20
PIF_VALUE: 20
PIF_VALUE: 22
PIF_VALUE: 24
PIF_VALUE: 19
PIF_VALUE: 24
PIF_VALUE: 20
PIF_VALUE: 20
PIF_VALUE: 19
PIF_VALUE: 26
PIF_VALUE: 20
PIF_VALUE: 23
PIF_VALUE: 20
PIF_VALUE: 27
PIF_VALUE: 20
PIF_VALUE: 24
PIF_VALUE: 20
PIF_VALUE: 20
PIF_VALUE: 19
PIF_VALUE: 19
PIF_VALUE: 20
PIF_VALUE: 20
PIF_VALUE: 19
PIF_VALUE: 20
PIF_VALUE: 20
PIF_VALUE: 26
PIF_VALUE: 25
PIF_VALUE: 20
PIF_VALUE: 20
PIF_VALUE: 19
PIF_VALUE: 20
PIF_VALUE: 26
PIF_VALUE: 25
PIF_VALUE: 19
PIF_VALUE: 25
PIF_VALUE: 19
PIF_VALUE: 20
PIF_VALUE: 5
PIF_VALUE: 18
PIF_VALUE: 32
PIF_VALUE: 20
PIF_VALUE: 27
PIF_VALUE: 24
PIF_VALUE: 20
PIF_VALUE: 27
PIF_VALUE: 26
PIF_VALUE: 25
PIF_VALUE: 20
PIF_VALUE: 24
PIF_VALUE: 1
PIF_VALUE: 19
PIF_VALUE: 21
PIF_VALUE: 19
PIF_VALUE: 20
PIF_VALUE: 19
PIF_VALUE: 24
PIF_VALUE: 19
PIF_VALUE: 20
PIF_VALUE: 19
PIF_VALUE: 25
PIF_VALUE: 25
PIF_VALUE: 20
PIF_VALUE: 19
PIF_VALUE: 20
PIF_VALUE: 19
PIF_VALUE: 20
PIF_VALUE: 19
PIF_VALUE: 22
PIF_VALUE: 23
PIF_VALUE: 20
PIF_VALUE: 0
PIF_VALUE: 20
PIF_VALUE: 26
PIF_VALUE: 19
PIF_VALUE: 25
PIF_VALUE: 0
PIF_VALUE: 20
PIF_VALUE: 20
PIF_VALUE: 19
PIF_VALUE: 26
PIF_VALUE: 19
PIF_VALUE: 19
PIF_VALUE: 20
PIF_VALUE: 20
PIF_VALUE: 25
PIF_VALUE: 19
PIF_VALUE: 20
PIF_VALUE: 24
PIF_VALUE: 19
PIF_VALUE: 21
PIF_VALUE: 20
PIF_VALUE: 25
PIF_VALUE: 27

## 2021-05-26 ASSESSMENT — PAIN DESCRIPTION - ONSET: ONSET: AWAKENED FROM SLEEP

## 2021-05-26 ASSESSMENT — PAIN SCALES - GENERAL
PAINLEVEL_OUTOF10: 5
PAINLEVEL_OUTOF10: 4
PAINLEVEL_OUTOF10: 5
PAINLEVEL_OUTOF10: 1
PAINLEVEL_OUTOF10: 7
PAINLEVEL_OUTOF10: 0
PAINLEVEL_OUTOF10: 7
PAINLEVEL_OUTOF10: 2

## 2021-05-26 ASSESSMENT — PAIN DESCRIPTION - DESCRIPTORS
DESCRIPTORS: ACHING;THROBBING
DESCRIPTORS: ACHING

## 2021-05-26 ASSESSMENT — PAIN DESCRIPTION - LOCATION
LOCATION: KNEE
LOCATION: BACK;ABDOMEN

## 2021-05-26 ASSESSMENT — PAIN DESCRIPTION - ORIENTATION
ORIENTATION: MID
ORIENTATION: RIGHT

## 2021-05-26 ASSESSMENT — PAIN DESCRIPTION - FREQUENCY
FREQUENCY: CONTINUOUS
FREQUENCY: CONTINUOUS

## 2021-05-26 ASSESSMENT — PAIN DESCRIPTION - PAIN TYPE
TYPE: SURGICAL PAIN;ACUTE PAIN
TYPE: CHRONIC PAIN

## 2021-05-26 ASSESSMENT — PAIN - FUNCTIONAL ASSESSMENT: PAIN_FUNCTIONAL_ASSESSMENT: ACTIVITIES ARE NOT PREVENTED

## 2021-05-26 ASSESSMENT — PAIN DESCRIPTION - PROGRESSION: CLINICAL_PROGRESSION: NOT CHANGED

## 2021-05-26 NOTE — ANESTHESIA PRE PROCEDURE
Department of Anesthesiology  Preprocedure Note       Name:  Cecilia Mercer   Age:  70 y.o.  :  1945                                          MRN:  [id number]         Date:  2021      Surgeon: Andrea Egan):  MD Angel Torres MD    Procedure: Procedure(s):  L3-4, L4-5 ANTERIOR LUMBAR INTERBODY FUSION WITH POSTERIOR LUMBAR L3-4. L4-5 DECOMPRESSION FUSION FIXATION USING PARAMEDIAN TECHNIQUES  . Medications prior to admission:   Prior to Admission medications    Medication Sig Start Date End Date Taking?  Authorizing Provider   ascorbic acid (VITAMIN C) 500 MG tablet Take 500 mg by mouth   Yes Historical Provider, MD   melatonin 10 MG CAPS capsule Take by mouth   Yes Historical Provider, MD   omeprazole (PRILOSEC) 20 MG delayed release capsule Take 20 mg by mouth 20  Yes Historical Provider, MD   simvastatin (ZOCOR) 40 MG tablet Take 40 mg by mouth 20  Yes Historical Provider, MD   Psyllium (METAMUCIL PO) Take by mouth   Yes Historical Provider, MD   aspirin 81 MG EC tablet Take 81 mg by mouth daily    Historical Provider, MD   celecoxib (CELEBREX) 200 MG capsule Take 200 mg by mouth 2 times daily    Historical Provider, MD   Multiple Vitamins-Iron TABS Take 1 tablet by mouth daily    Historical Provider, MD   Omega-3 Fatty Acids (FISH OIL) 1000 MG CAPS Take 1 g by mouth    Historical Provider, MD   Acetaminophen (TYLENOL PO) Take by mouth    Historical Provider, MD       Current medications:    Current Facility-Administered Medications   Medication Dose Route Frequency Provider Last Rate Last Admin    ceFAZolin (ANCEF) 2000 mg in dextrose 3 % 50 mL IVPB (duplex)  2,000 mg Intravenous Once Marcos Pickett MD        lactated ringers infusion   Intravenous Continuous Sonya José DO        sodium chloride flush 0.9 % injection 10 mL  10 mL Intravenous 2 times per day Sonya José DO        sodium chloride flush 0.9 % injection 10 mL  10 mL Intravenous PRN Re Taylor DO        0.9 % sodium chloride infusion  25 mL Intravenous PRN Re Taylor DO           Allergies:  No Known Allergies    Problem List:  There is no problem list on file for this patient. Past Medical History:        Diagnosis Date    GERD (gastroesophageal reflux disease)     Hyperlipidemia     Spinal stenosis        Past Surgical History:        Procedure Laterality Date    BACK SURGERY  01/22/2020    CARPAL TUNNEL RELEASE      HYSTERECTOMY      NASAL SEPTUM SURGERY  11/2020       Social History:    Social History     Tobacco Use    Smoking status: Never Smoker    Smokeless tobacco: Never Used   Substance Use Topics    Alcohol use: Not Currently                                Counseling given: Not Answered      Vital Signs (Current):   Vitals:    05/20/21 1534 05/26/21 1123   BP:  (!) 164/76   Pulse:  80   Resp:  16   Temp:  98.2 °F (36.8 °C)   TempSrc:  Oral   SpO2:  99%   Weight: 213 lb (96.6 kg) 213 lb (96.6 kg)   Height: 5' 6\" (1.676 m) 5' 6\" (1.676 m)                                              BP Readings from Last 3 Encounters:   05/26/21 (!) 164/76       NPO Status: Time of last liquid consumption: 0700                        Time of last solid consumption: 0700 (sip with meds and last solid meal was at 1800 on 5/25/21)                        Date of last liquid consumption: 05/26/21                        Date of last solid food consumption: 05/26/21    BMI:   Wt Readings from Last 3 Encounters:   05/26/21 213 lb (96.6 kg)     Body mass index is 34.38 kg/m².     CBC: No results found for: WBC, RBC, HGB, HCT, MCV, RDW, PLT    CMP:   Lab Results   Component Value Date     09/30/2011    K 4.3 09/30/2011     09/30/2011    CO2 28 09/30/2011    BUN 18 09/30/2011    CREATININE 0.8 09/30/2011    GFRAA >60 09/30/2011    AGRATIO 1.3 09/30/2011    GLUCOSE 92 09/30/2011    PROT 7.5 09/30/2011    CALCIUM 9.6 09/30/2011    BILITOT 0.70 09/30/2011    ALKPHOS 84 09/30/2011    AST 21 09/30/2011    ALT 15 09/30/2011       POC Tests: No results for input(s): POCGLU, POCNA, POCK, POCCL, POCBUN, POCHEMO, POCHCT in the last 72 hours. Coags: No results found for: PROTIME, INR, APTT    HCG (If Applicable): No results found for: PREGTESTUR, PREGSERUM, HCG, HCGQUANT     ABGs: No results found for: PHART, PO2ART, WEJ0DIJ, SJZ8HXG, BEART, T7VCWRMD     Type & Screen (If Applicable):  No results found for: LABABO, LABRH    Drug/Infectious Status (If Applicable):  No results found for: HIV, HEPCAB    COVID-19 Screening (If Applicable):   Lab Results   Component Value Date    COVID19 Not Detected 05/20/2021           Anesthesia Evaluation  Patient summary reviewed and Nursing notes reviewed no history of anesthetic complications:   Airway: Mallampati: II  TM distance: <3 FB   Neck ROM: full  Mouth opening: > = 3 FB Dental: normal exam         Pulmonary:Negative Pulmonary ROS and normal exam  breath sounds clear to auscultation                             Cardiovascular:  Exercise tolerance: good (>4 METS),         NYHA Classification: I  ECG reviewed  Rhythm: regular  Rate: normal           Beta Blocker:  Not on Beta Blocker         Neuro/Psych:   Negative Neuro/Psych ROS              GI/Hepatic/Renal:   (+) GERD: well controlled,           Endo/Other: Negative Endo/Other ROS                    Abdominal:           Vascular: negative vascular ROS. Anesthesia Plan      general     ASA 2       Induction: intravenous. MIPS: Postoperative opioids intended. Anesthetic plan and risks discussed with patient. Use of blood products discussed with patient whom consented to blood products. Plan discussed with CRNA.     Attending anesthesiologist reviewed and agrees with Pre Eval content              Olegario Dove DO   5/26/2021

## 2021-05-26 NOTE — PROGRESS NOTES
Patient arrived to PACU post L3-4, L4-5 ANTERIOR LUMBAR INTERBODY FUSION WITH POSTERIOR LUMBAR L3-4. L4-5 DECOMPRESSION FUSION FIXATION USING PARAMEDIAN TECHNIQUES by Dr. Ronn Pennington. Patient arrived drowsy but responsive on 4L NC. Patient is moving both extremities independently but not following commands a this time. Patient denies pain. Patient hooked up to PACU monitors. Report received from [facility] 27, per report patient had some pauses on the EKG with PAC's,  HR dropped to 49. Patient was given robinul, HR on arrival is SR 80 BPM. B/L lower back incisions and midline abd incision CD&I with surgical glue.

## 2021-05-26 NOTE — H&P
Kenyetta Mackey    [id number]    [facility] Same Day Surgery Update H & P  Department of General Surgery   Surgical Service   Pre-operative History and Physical  Last H & P within the last 30 days. DIAGNOSIS:   Spondylolisthesis of lumbar region [M43.16]  Lumbar stenosis with neurogenic claudication [M48.062]    Procedure(s):  L3-4, L4-5 ANTERIOR LUMBAR INTERBODY FUSION WITH POSTERIOR LUMBAR L3-4. L4-5 DECOMPRESSION FUSION FIXATION USING PARAMEDIAN TECHNIQUES  . HISTORY OF PRESENT ILLNESS:   Patient with chronic lumbar pain and left posterior hip pain. The symptoms have been recalcitrant to conservative treatment and the patient presents today for the above procedure. Covid 19:  Patient denies fever, chills, cough or known exposure to Covid-19. Past Medical History:        Diagnosis Date    GERD (gastroesophageal reflux disease)     Hyperlipidemia     Spinal stenosis      Past Surgical History:        Procedure Laterality Date    BACK SURGERY  01/22/2020    CARPAL TUNNEL RELEASE      HYSTERECTOMY      NASAL SEPTUM SURGERY  11/2020     Past Social History:  Social History     Socioeconomic History    Marital status:      Spouse name: None    Number of children: None    Years of education: None    Highest education level: None   Occupational History    None   Tobacco Use    Smoking status: Never Smoker    Smokeless tobacco: Never Used   Substance and Sexual Activity    Alcohol use: Not Currently    Drug use: Never    Sexual activity: None   Other Topics Concern    None   Social History Narrative    None     Social Determinants of Health     Financial Resource Strain:     Difficulty of Paying Living Expenses:    Food Insecurity:     Worried About Running Out of Food in the Last Year:     Ran Out of Food in the Last Year:    Transportation Needs:     Lack of Transportation (Medical):      Lack of Transportation (Non-Medical):    Physical Activity:     Days of Exercise per Week:     Minutes of Exercise per Session:    Stress:     Feeling of Stress :    Social Connections:     Frequency of Communication with Friends and Family:     Frequency of Social Gatherings with Friends and Family:     Attends [employer] Services:     Active Member of Clubs or Organizations:     Attends Club or Organization Meetings:     Marital Status:    Intimate Partner Violence:     Fear of Current or Ex-Partner:     Emotionally Abused:     Physically Abused:     Sexually Abused:          Medications Prior to Admission:      Prior to Admission medications    Medication Sig Start Date End Date Taking? Authorizing Provider   ascorbic acid (VITAMIN C) 500 MG tablet Take 500 mg by mouth   Yes Historical Provider, MD   melatonin 10 MG CAPS capsule Take by mouth   Yes Historical Provider, MD   omeprazole (PRILOSEC) 20 MG delayed release capsule Take 20 mg by mouth 12/22/20  Yes Historical Provider, MD   simvastatin (ZOCOR) 40 MG tablet Take 40 mg by mouth 12/22/20  Yes Historical Provider, MD   Psyllium (METAMUCIL PO) Take by mouth   Yes Historical Provider, MD   Acetaminophen (TYLENOL PO) Take by mouth   Yes Historical Provider, MD   aspirin 81 MG EC tablet Take 81 mg by mouth daily    Historical Provider, MD   celecoxib (CELEBREX) 200 MG capsule Take 200 mg by mouth 2 times daily    Historical Provider, MD   Multiple Vitamins-Iron TABS Take 1 tablet by mouth daily    Historical Provider, MD   Omega-3 Fatty Acids (FISH OIL) 1000 MG CAPS Take 1 g by mouth    Historical Provider, MD         Allergies:  Patient has no known allergies.     PHYSICAL EXAM:      BP (!) 164/76   Pulse 80   Temp 98.2 °F (36.8 °C) (Oral)   Resp 16   Ht 5' 6\" (1.676 m)   Wt 213 lb (96.6 kg)   SpO2 99%   BMI 34.38 kg/m²      Airway:  Airway patent with no audible stridor    Heart:  Regular rate and rhythm, No murmur noted    Lungs:  No increased work of breathing, good air exchange, clear to auscultation bilaterally, no crackles or wheezing    Abdomen:  Soft, non-distended, non-tender, normal active bowel sounds, no masses palpated    ASSESSMENT AND PLAN    Patient is a 70 y.o. female with above specified procedure planned. 1.  The patients history and physical was obtained and signed off by the pre-admission testing department. Patient seen and focused exam done today- no new changes since last physical exam on 4/29/21    2. Access to ancillary services are available per request of the provider.     Eduardo Nageotte, APRN - CNP     5/26/2021

## 2021-05-26 NOTE — FLOWSHEET NOTE
Patient continues to rate pain 5/10 verbalizing no pain relief after last dose of fentanyl. Patient has been sleeping and RR has dropped below 10/min periodically. Patient has received completed dose of robaxin. Patient aware that she cannot receive more pain medication while she is so drowsy, verbalized understanding.

## 2021-05-26 NOTE — PROGRESS NOTES
Pt alert and oriented x4. Consent signed and on chart. IV placed x2. IVF infusing. MRSA nares sent and pending. Chlorhexidine wipes lumbar/abdomen regions. Scopolamine patch behind R ear.

## 2021-05-26 NOTE — PROGRESS NOTES
PACU Transfer Note    Vitals:    05/26/21 1915   BP: (!) 148/63   Pulse: 78   Resp: 14   Temp: 98.1 °F (36.7 °C)   SpO2: 100%       In: 2555 [I.V.:2505]  Out: 885 [Urine:810]    Pain assessment:  present - adequately treated  Pain Level: 5    Patient meets sheila discharge criteria. [facility] to [facility]L NC with all belongings including back brace, 2 clear hospital bags and one black rollaway.    5/26/2021 7:22 PM

## 2021-05-27 ENCOUNTER — APPOINTMENT (OUTPATIENT)
Dept: GENERAL RADIOLOGY | Age: 76
DRG: 454 | End: 2021-05-27
Attending: NEUROLOGICAL SURGERY
Payer: MEDICARE

## 2021-05-27 ENCOUNTER — APPOINTMENT (OUTPATIENT)
Dept: CT IMAGING | Age: 76
DRG: 454 | End: 2021-05-27
Attending: NEUROLOGICAL SURGERY
Payer: MEDICARE

## 2021-05-27 LAB
HCT VFR BLD CALC: 37.4 % (ref 36–48)
HEMOGLOBIN: 12.4 G/DL (ref 12–16)
MRSA SCREEN RT-PCR: ABNORMAL
ORGANISM: ABNORMAL

## 2021-05-27 PROCEDURE — 85014 HEMATOCRIT: CPT

## 2021-05-27 PROCEDURE — 97116 GAIT TRAINING THERAPY: CPT

## 2021-05-27 PROCEDURE — 85018 HEMOGLOBIN: CPT

## 2021-05-27 PROCEDURE — 6370000000 HC RX 637 (ALT 250 FOR IP): Performed by: NURSE PRACTITIONER

## 2021-05-27 PROCEDURE — 2580000003 HC RX 258: Performed by: PHYSICIAN ASSISTANT

## 2021-05-27 PROCEDURE — 72100 X-RAY EXAM L-S SPINE 2/3 VWS: CPT

## 2021-05-27 PROCEDURE — 6370000000 HC RX 637 (ALT 250 FOR IP): Performed by: PHYSICIAN ASSISTANT

## 2021-05-27 PROCEDURE — 97165 OT EVAL LOW COMPLEX 30 MIN: CPT

## 2021-05-27 PROCEDURE — 97535 SELF CARE MNGMENT TRAINING: CPT

## 2021-05-27 PROCEDURE — 97161 PT EVAL LOW COMPLEX 20 MIN: CPT

## 2021-05-27 PROCEDURE — 97530 THERAPEUTIC ACTIVITIES: CPT

## 2021-05-27 PROCEDURE — 6370000000 HC RX 637 (ALT 250 FOR IP): Performed by: NEUROLOGICAL SURGERY

## 2021-05-27 PROCEDURE — 6360000002 HC RX W HCPCS: Performed by: PHYSICIAN ASSISTANT

## 2021-05-27 PROCEDURE — 72131 CT LUMBAR SPINE W/O DYE: CPT

## 2021-05-27 PROCEDURE — 1200000000 HC SEMI PRIVATE

## 2021-05-27 PROCEDURE — 36415 COLL VENOUS BLD VENIPUNCTURE: CPT

## 2021-05-27 RX ORDER — ACETAMINOPHEN 500 MG
1000 TABLET ORAL EVERY 6 HOURS
Status: DISCONTINUED | OUTPATIENT
Start: 2021-05-27 | End: 2021-05-29 | Stop reason: HOSPADM

## 2021-05-27 RX ORDER — MAGNESIUM HYDROXIDE/ALUMINUM HYDROXICE/SIMETHICONE 120; 1200; 1200 MG/30ML; MG/30ML; MG/30ML
30 SUSPENSION ORAL EVERY 6 HOURS PRN
Status: DISCONTINUED | OUTPATIENT
Start: 2021-05-27 | End: 2021-05-29 | Stop reason: HOSPADM

## 2021-05-27 RX ORDER — CALCIUM CARBONATE 200(500)MG
500 TABLET,CHEWABLE ORAL 3 TIMES DAILY PRN
Status: DISCONTINUED | OUTPATIENT
Start: 2021-05-27 | End: 2021-05-29 | Stop reason: HOSPADM

## 2021-05-27 RX ORDER — METHOCARBAMOL 750 MG/1
750 TABLET, FILM COATED ORAL EVERY 6 HOURS PRN
Status: DISCONTINUED | OUTPATIENT
Start: 2021-05-27 | End: 2021-05-29 | Stop reason: HOSPADM

## 2021-05-27 RX ORDER — POLYETHYLENE GLYCOL 3350 17 G/17G
17 POWDER, FOR SOLUTION ORAL DAILY
Status: DISCONTINUED | OUTPATIENT
Start: 2021-05-27 | End: 2021-05-29 | Stop reason: HOSPADM

## 2021-05-27 RX ADMIN — IBUPROFEN 800 MG: 600 TABLET ORAL at 23:52

## 2021-05-27 RX ADMIN — ONDANSETRON 4 MG: 2 INJECTION INTRAMUSCULAR; INTRAVENOUS at 03:06

## 2021-05-27 RX ADMIN — CEFAZOLIN SODIUM 2000 MG: 2 SOLUTION INTRAVENOUS at 06:33

## 2021-05-27 RX ADMIN — POLYETHYLENE GLYCOL 3350 17 G: 17 POWDER, FOR SOLUTION ORAL at 09:21

## 2021-05-27 RX ADMIN — ACETAMINOPHEN 1000 MG: 500 TABLET ORAL at 14:46

## 2021-05-27 RX ADMIN — OXYCODONE 5 MG: 5 TABLET ORAL at 09:21

## 2021-05-27 RX ADMIN — ALUMINUM HYDROXIDE, MAGNESIUM HYDROXIDE, AND SIMETHICONE 30 ML: 200; 200; 20 SUSPENSION ORAL at 18:09

## 2021-05-27 RX ADMIN — HEPARIN SODIUM 5000 UNITS: 5000 INJECTION INTRAVENOUS; SUBCUTANEOUS at 20:28

## 2021-05-27 RX ADMIN — Medication 10 ML: at 20:29

## 2021-05-27 RX ADMIN — ATORVASTATIN CALCIUM 20 MG: 20 TABLET, FILM COATED ORAL at 09:21

## 2021-05-27 RX ADMIN — OXYCODONE 10 MG: 5 TABLET ORAL at 16:27

## 2021-05-27 RX ADMIN — HEPARIN SODIUM 5000 UNITS: 5000 INJECTION INTRAVENOUS; SUBCUTANEOUS at 14:46

## 2021-05-27 RX ADMIN — HEPARIN SODIUM 5000 UNITS: 5000 INJECTION INTRAVENOUS; SUBCUTANEOUS at 06:34

## 2021-05-27 RX ADMIN — DOCUSATE SODIUM 50 MG AND SENNOSIDES 8.6 MG 1 TABLET: 8.6; 5 TABLET, FILM COATED ORAL at 09:21

## 2021-05-27 RX ADMIN — ANTACID TABLETS 500 MG: 500 TABLET, CHEWABLE ORAL at 16:23

## 2021-05-27 RX ADMIN — ACETAMINOPHEN 650 MG: 325 TABLET ORAL at 02:53

## 2021-05-27 RX ADMIN — METHOCARBAMOL 1000 MG: 100 INJECTION, SOLUTION INTRAMUSCULAR; INTRAVENOUS at 02:30

## 2021-05-27 RX ADMIN — DOCUSATE SODIUM 50 MG AND SENNOSIDES 8.6 MG 1 TABLET: 8.6; 5 TABLET, FILM COATED ORAL at 20:28

## 2021-05-27 RX ADMIN — ANTACID TABLETS 500 MG: 500 TABLET, CHEWABLE ORAL at 09:29

## 2021-05-27 RX ADMIN — METHOCARBAMOL 1000 MG: 100 INJECTION, SOLUTION INTRAMUSCULAR; INTRAVENOUS at 11:23

## 2021-05-27 RX ADMIN — Medication 10 MG: at 21:51

## 2021-05-27 RX ADMIN — PANTOPRAZOLE SODIUM 40 MG: 40 TABLET, DELAYED RELEASE ORAL at 06:33

## 2021-05-27 RX ADMIN — Medication 10 ML: at 09:21

## 2021-05-27 RX ADMIN — ACETAMINOPHEN 1000 MG: 500 TABLET ORAL at 09:21

## 2021-05-27 ASSESSMENT — PAIN DESCRIPTION - LOCATION
LOCATION: BACK;ABDOMEN;KNEE
LOCATION: ABDOMEN;BACK
LOCATION: BACK

## 2021-05-27 ASSESSMENT — PAIN SCALES - GENERAL
PAINLEVEL_OUTOF10: 4
PAINLEVEL_OUTOF10: 2
PAINLEVEL_OUTOF10: 3
PAINLEVEL_OUTOF10: 0
PAINLEVEL_OUTOF10: 3
PAINLEVEL_OUTOF10: 0
PAINLEVEL_OUTOF10: 7
PAINLEVEL_OUTOF10: 4

## 2021-05-27 ASSESSMENT — PAIN DESCRIPTION - ORIENTATION
ORIENTATION: LOWER;ANTERIOR;POSTERIOR
ORIENTATION: POSTERIOR;ANTERIOR

## 2021-05-27 ASSESSMENT — PAIN DESCRIPTION - PAIN TYPE
TYPE: SURGICAL PAIN
TYPE: SURGICAL PAIN
TYPE: SURGICAL PAIN;CHRONIC PAIN

## 2021-05-27 ASSESSMENT — PAIN DESCRIPTION - PROGRESSION
CLINICAL_PROGRESSION: GRADUALLY WORSENING

## 2021-05-27 ASSESSMENT — PAIN DESCRIPTION - ONSET
ONSET: ON-GOING
ONSET: PROGRESSIVE
ONSET: ON-GOING

## 2021-05-27 ASSESSMENT — PAIN DESCRIPTION - FREQUENCY
FREQUENCY: OTHER (COMMENT)
FREQUENCY: CONTINUOUS
FREQUENCY: CONTINUOUS

## 2021-05-27 ASSESSMENT — PAIN - FUNCTIONAL ASSESSMENT
PAIN_FUNCTIONAL_ASSESSMENT: PREVENTS OR INTERFERES SOME ACTIVE ACTIVITIES AND ADLS
PAIN_FUNCTIONAL_ASSESSMENT: PREVENTS OR INTERFERES SOME ACTIVE ACTIVITIES AND ADLS
PAIN_FUNCTIONAL_ASSESSMENT: PREVENTS OR INTERFERES WITH MANY ACTIVE NOT PASSIVE ACTIVITIES

## 2021-05-27 ASSESSMENT — PAIN DESCRIPTION - DESCRIPTORS
DESCRIPTORS: STABBING
DESCRIPTORS: STABBING;ACHING

## 2021-05-27 NOTE — PLAN OF CARE
Problem: Pain:  Goal: Pain level will decrease  Description: Pain level will decrease. Patient resting comfortably, requests oral pain medication PRN. Outcome: Ongoing     Problem: Falls - Risk of:  Goal: Will remain free from falls  Description: Fall precautions in place. Bed is in lowest position, wheels locked, alarm on, non-skid socks on. Call light and bedside table within reach. Patient calls out appropriately. Patient is up x1 assist with walker and brace. Will continue to assess and monitor.     5/27/2021 0805 by Mary Rose RN  Outcome: Ongoing

## 2021-05-27 NOTE — PROGRESS NOTES
Patient is alert and oriented. Tolerating diet well, denies nausea/vomiting. She requests pain medication PRN. x1 assist with walker, voiding adequately. All incisions CD&I, VSS.

## 2021-05-27 NOTE — PROGRESS NOTES
out of bed. Remove to sleep and shower. Apply when dangling. Vision/Hearing  Vision: Within Functional Limits  Hearing: Within functional limits     Subjective  General  Additional Pertinent Hx: PMH: spinal stenosis, lumbar laminectomy 1/2020. Pt admitted for: L3-4, L4-5 ANTERIOR LUMBAR INTERBODY FUSION WITH POSTERIOR LUMBAR L3-4. L4-5 DECOMPRESSION FUSION FIXATION USING PARAMEDIAN TECHNIQUES performed on 5/26. Family / Caregiver Present: No  Referring Practitioner: Jeramie  Referral Date : 05/26/21  Diagnosis: lumbar spondylolisthesis, stenosis with neurogenic claudication  Follows Commands: Within Functional Limits  Subjective  Subjective: Pt supine in bed & agreeable to PT. Pain Screening  Patient Currently in Pain: Yes (5/10 low back, incision site. RN in to give pain meds,)  Vital Signs  Patient Currently in Pain: Yes (5/10 low back, incision site. RN in to give pain meds,)       Orientation  Orientation  Overall Orientation Status: Within Normal Limits  Social/Functional History  Social/Functional History  Lives With: Alone  Type of Home: House  Home Layout: One level  Home Access: Stairs to enter without rails (pt holds onto cart)  Entrance Stairs - Number of Steps: 1  Bathroom Shower/Tub: Walk-in shower  Bathroom Toilet: Handicap height (sink next to)  [employer]: Hand-held shower  Home Equipment: 4 wheeled walker, Reacher, Sock aid  ADL Assistance: Independent  Homemaking Assistance: Independent  Ambulation Assistance: Independent  Transfer Assistance: Independent  Active : Yes  Occupation: Retired  Additional Comments: son to stay with pt upon D/C as long as needed. no falls.        Objective          AROM RLE (degrees)  RLE AROM: WNL  AROM LLE (degrees)  LLE AROM : WNL  Strength RLE  Strength RLE: WFL  Strength LLE  Strength LLE: WFL        Bed mobility  Rolling to Left: Independent  Rolling to Right: Independent  Supine to Sit: Stand by assistance (HOB flat, via logroll.)  Sit to Supine: Stand by assistance (HOB flat, via logroll)  Comment: pt donned & doffed LSO independently while seated at EOB  Transfers  Sit to Stand: Stand by assistance (from bed. verbal cues required)  Stand to sit: Stand by assistance (verbal cues required)  Ambulation  Ambulation?: Yes  Ambulation 1  Surface: level tile  Device: Rolling Walker  Assistance: Stand by assistance  Gait Deviations: Slow Emma;Decreased step height  Distance: 250 ft  Comments: steady, no LOB. Stairs/Curb  Stairs?: Yes  Stairs  # Steps : 2  Stairs Height: 6\"  Rails: Right ascending  Curbs: 6\"  Device: Rolling walker  Assistance: Contact guard assistance  Comment: PT placed RW on/off curb step. son to assist pt in/out of house.               Plan   Plan  Times per week: 5-7  Current Treatment Recommendations: Functional Mobility Training, Transfer Training, Gait Training, Stair training, Safety Education & Training  Safety Devices  Type of devices: Call light within reach, Bed alarm in place, Left in bed, Nurse notified                                                       AM-PAC Score  AM-PAC Inpatient Mobility Raw Score : 19 (05/27/21 1020)  AM-PAC Inpatient T-Scale Score : 45.44 (05/27/21 1020)  Mobility Inpatient CMS 0-100% Score: 41.77 (05/27/21 1020)  Mobility Inpatient CMS G-Code Modifier : CK (05/27/21 1020)          Goals  Short term goals  Time Frame for Short term goals: D/C  Short term goal 1: supine<->sit IND via logroll  Short term goal 2: sit<->stand SUPV  Short term goal 3: Amb 150 ft with RW SUPV  Patient Goals   Patient goals : to go home       Therapy Time   Individual Concurrent Group Co-treatment   Time In 0914         Time Out 0952         Minutes [address], PT

## 2021-05-27 NOTE — PLAN OF CARE
Problem: Pain:  Goal: Control of acute pain  Description: Control of acute pain  Outcome: Met This Shift   Controlled with PO  Problem: Falls - Risk of:  Goal: Will remain free from falls  Description: Will remain free from falls  Outcome: Met This Shift   Pt free from injury this shift and free of falls. 2/4 rails up on bed and bed is in the lowest position. Wheels locked and bed alarm set. Socks on pt and ID bands on pt. Call light in reach of pt and pt educated to call out to get up x2 walker gb. Will continue to monitor for safety.

## 2021-05-27 NOTE — PROGRESS NOTES
Occupational Therapy   Occupational Therapy Initial Assessment and Treatment  Discharge  Date: 2021   Patient Name: Suzanne Mora  MRN: [id number]     : 1945    Date of Service: 2021    Discharge Recommendations:  Suzanne Mora scored a 20/24 on the AM-PAC ADL Inpatient form. Current research shows that an AM-PAC score of 18 or greater is typically associated with a discharge to the patient's home setting. OT Equipment Recommendations  Equipment Needed: No    Assessment   Assessment: Pt seen POD#1 s/p L3-4, L4-5 ANTERIOR LUMBAR INTERBODY FUSION WITH POSTERIOR LUMBAR L3-4. L4-5 DECOMPRESSION FUSION FIXATION. Pt demonstrating safety and SB/Supervison for UB dressing and functional transfers/mobility. Pt is requiring increased assist for LB ADLs w/o AE (pt has AE at home and denies need to practice using them as she was using them PTA). Plans are for home at discharge - prn assist from son. No furhter OT needs indicated. Will sign off - defer mobility to nursing staff. Decision Making: Low Complexity  OT Education: OT Role;Plan of Care;Transfer Training;Precautions; ADL Adaptive Strategies  Patient Education: pt is familiar w/ postop course, modified ADL techniques using AE from previous surgery  No Skilled OT: No OT goals identified  REQUIRES OT FOLLOW UP: No  Activity Tolerance  Activity Tolerance: Patient Tolerated treatment well  Safety Devices  Safety Devices in place: Yes  Type of devices: Nurse notified; Left in bed;Call light within reach; Bed alarm in place (pillows placed for comfort)           Patient Diagnosis(es): The encounter diagnosis was Lumbar stenosis with neurogenic claudication. 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 has a past surgical history that includes back surgery (2020); Carpal tunnel release; Hysterectomy;  Nasal Supervision    Toilet Transfers  Toilet - Technique: Ambulating (using wh walker)  Equipment Used: Standard toilet (w/ bar)  Toilet Transfer: Stand by assistance  Toilet Transfers Comments: Note: initially cues for safe hand placement (not pulling up on walker) - demo learning    ADL  Grooming: Stand by assistance (washing hands in standing at sink level)  UE Dressing: Stand by assistance (to don/doff LSO brace (received education about brace preop))  LE Dressing: Maximum assistance (w/o AE; has AE and denies concerns)  Toileting: Stand by assistance ((+) void)     Coordination  Movements Are Fluid And Coordinated: Yes     Bed mobility  Supine to Sit: Stand by assistance (via log roll)  Sit to Supine: Stand by assistance (via log roll)     Transfers  Sit to stand: Stand by assistance  Stand to sit: Stand by assistance        Cognition  Overall Cognitive Status: WNL        Sensation  Overall Sensation Status: WFL              LUE Strength  Gross LUE Strength: WFL  RUE Strength  Gross RUE Strength: WFL           Pt seen by OT for eval and treat. Treatment included: bed mobility, functional transfer/mobility, ADL, pt education            Plan   Discharge acute OT - no further needs.                                                       AM-PAC Score        AM-PAC Inpatient Daily Activity Raw Score: 20 (05/27/21 1558)  AM-PAC Inpatient ADL T-Scale Score : 42.03 (05/27/21 1558)  ADL Inpatient CMS 0-100% Score: 38.32 (05/27/21 1558)  ADL Inpatient CMS G-Code Modifier : CJ (05/27/21 1558)              Therapy Time   Individual Concurrent Group Co-treatment   Time In 1315         Time Out 1358         Minutes 43           Timed Code Treatment Minutes:   28    Total Treatment Minutes:  [address] OTR/L #1010

## 2021-05-27 NOTE — ANESTHESIA POSTPROCEDURE EVALUATION
Department of Anesthesiology  Postprocedure Note    Patient: Liss Vang  MRN: [id number]  YOB: 1945  Date of evaluation: 5/26/2021  Time:  8:45 PM     Procedure Summary     Date: 05/26/21 Room / Location: [address] [place] [address] [address]    Anesthesia Start: 6410 Anesthesia Stop: 5297    Procedures:       L3-4, L4-5 ANTERIOR LUMBAR INTERBODY FUSION WITH POSTERIOR LUMBAR L3-4. L4-5 DECOMPRESSION FUSION FIXATION USING PARAMEDIAN TECHNIQUES (N/A Abdomen)      . (N/A Spine Lumbar) Diagnosis:       Spondylolisthesis of lumbar region      Lumbar stenosis with neurogenic claudication      (Spondylolisthesis of lumbar region [M43.16] Lumbar stenosis with neurogenic claudication [M48.062])    Surgeons: Mer Quan MD Responsible Provider: Yung Saenz DO    Anesthesia Type: general ASA Status: 2          Anesthesia Type: general    Dolores Phase I: Dolores Score: 9    Dolores Phase II:      Last vitals: Reviewed and per EMR flowsheets.        Anesthesia Post Evaluation    Patient location during evaluation: PACU  Patient participation: complete - patient participated  Level of consciousness: awake and alert  Airway patency: patent  Nausea & Vomiting: no nausea and no vomiting  Cardiovascular status: blood pressure returned to baseline  Respiratory status: acceptable  Hydration status: euvolemic

## 2021-05-27 NOTE — OP NOTE
[address]                [address]                                OPERATIVE REPORT    PATIENT NAME: Seda Solares                 :        1945  MED REC NO:   [id number]                          ROOM:       1  ACCOUNT NO:   [de-identified]                           ADMIT DATE: 2021  PROVIDER:     Josh Lawler    DATE OF PROCEDURE:  2021    PREOPERATIVE DIAGNOSIS:  Degenerative disk disease, L3-L4 and L4-L5 disk  space. POSTOPERATIVE DIAGNOSIS:  Degenerative disk disease, L3-L4 and L4-L5  disk space. PROCEDURE PERFORMED:  Anterior retroperitoneal exposure of L3-L4 and  L4-L5 disk spaces. SURGEONS:  Josh Lawler MD; Jaleel Sullivan MD.    ANESTHESIA:  General.    ESTIMATED BLOOD LOSS:  Minimal.    DRAINS:  None. COMPLICATIONS:  None. REASON FOR THE PROCEDURE:  The patient is a 51-year-old female with  longstanding history of chronic back and leg pain. She was evaluated by  Dr. Sasha Dickson and was felt to require anteroposterior fusion of the L3-L4 and  L4-L5 disk spaces. I met the patient preoperatively and had an  extensive discussion with her regarding the risks related to the  exposure. Risks discussed included bleeding; infection; the possibility  of bowel, bladder, or ureteral injuries; possibility of nerve damage;  paresthesias; hernias; lymph leaks; the possibility of arterial or  venous thrombosis requiring thrombectomy or bypass and the possibility  of retroperitoneal fluid collections requiring interventional drainage  were all extensively discussed. The patient understood these risks and  wished to proceed. PROCEDURE IN DETAIL:  The patient was taken to the operating room,  placed on the operating table in a supine position. General  endotracheal anesthesia was induced. IV antibiotics were administered  and a Joe catheter was placed.   Preoperative localization of disk  space was carried out with fluoroscopy. The abdomen was then prepped  and draped in the usual sterile fashion. Midline incision was made  extending a few fingerbreadths above the umbilicus to just above the  pubic symphysis. We dissected through the subcutaneous tissues and  identified the left anterior rectus sheath which was incised. The left  rectus abdominis muscle was mobilized from the midline toward the left  side. Retroperitoneum was entered in the left lower quadrant. Peritoneal contents were swept towards the midline. A Bookwalter  retractor was placed in the field. Bipolar cautery was used to free up  the soft tissue lateral to the left common iliac artery and vein. The  left iliolumbar vein was suture ligated. Segment of vessels were  sequentially clipped and ligated, and we were able to get exposure to  the L3-L4, as well as L4-L5 disk spaces. Disk markers were placed at  both levels to confirm the level of the disk space as well as midline. Once that was completed, diskectomy and cage placement were carried out  by Dr. Robbie Coats at both levels. I then carefully inspected the wound for  hemostasis. Once hemostasis was ensured, I reapproximated the anterior  rectus sheath with 0 PDS silk sutures. Subcutaneous tissue was  reapproximated in two layers with 3-0 Vicryl and skin was closed with  4-0 Monocryl. Fluoroscopy sweep confirmed no retained sponges or  instruments. I was present for the entire anterior portion of the  procedure, and I assisted Dr. Robbie Coats with his portion of the procedure  which included soft tissue and blood vessel retraction to facilitate  implant placement.         Bethany Messer    D: 05/26/2021 19:44:17       T: 05/26/2021 22:57:37     [email or web address]  Job#: [id number]     Doc#: [id number]

## 2021-05-27 NOTE — PROGRESS NOTES
4 Eyes Admission Assessment     I agree as the admission nurse that 2 RN's have performed a thorough Head to Toe Skin Assessment on the patient. ALL assessment sites listed below have been assessed on admission. Areas assessed by both nurses:   [x]   Head, Face, and Ears   [x]   Shoulders, Back, and Chest  [x]   Arms, Elbows, and Hands   [x]   Coccyx, Sacrum, and Ischium  [x]   Legs, Feet, and Heels        Does the Patient have Skin Breakdown? Skin intact with exception to surgical incisions.          Scott Prevention initiated:  Yes   Wound Care Orders initiated:  NA      [facility] nurse consulted for Pressure Injury (Stage 3,4, Unstageable, DTI, NWPT, and Complex wounds) or Scott score 18 or lower:  No      Nurse 1 eSignature: Electronically signed by Meredith Fair RN on 5/27/21 at 1:09 AM EDT    **SHARE this note so that the co-signing nurse is able to place an eSignature**    Nurse 2 eSignature: {Esignature:[id number]}

## 2021-05-27 NOTE — CARE COORDINATION
Case Management Assessment           Initial Evaluation                Date / Time of Evaluation: 5/27/2021 5:03 PM                 Assessment Completed by: CHETNA Le LSW    Patient Name: Cecilia Mercer     YOB: 1945  Diagnosis: Spondylolisthesis of lumbar region [M43.16]  Lumbar stenosis with neurogenic claudication [M48.062]     Date / Time: 5/26/2021 10:59 AM    Patient Admission Status: Inpatient    If patient is discharged prior to next notation, then this note serves as note for discharge by case management. Current PCP: Jalil CARUSO Patient: No    Chart Reviewed: Yes  Patient/ Family Interviewed: Yes    Initial assessment completed at bedside with: Pt    Hospitalization in the last 30 days: No    Emergency Contacts:  Extended Emergency Contact Information  Primary Emergency Contact: [address] [place] [address], [address] [place] [address] Phone: [phone]  Relation: Child   needed? No    Advance Directives:   Code Status: Full Code        Financial  Payor: Suellyn Duane / Plan: [employer] GOLD PLUS HMO / Product Type: *No Product type* /     Pre-cert required for SNF: Yes    Pharmacy    [facility] #[id number], [address] [place] [address]  Phone: [phone] Fax: [phone]      Potential assistance Purchasing Medications: Potential Assistance Purchasing Medications: No  Does Patient want to participate in local refill/ meds to beds program?: Not Assessed    Meds To Beds General Rules:  1. Can ONLY be done Monday- Friday between 8:30am-5pm  2. Prescription(s) must be in pharmacy by 3pm to be filled same day  3. Copy of patient's insurance/ prescription drug card and patient face sheet must be sent along with the prescription(s)  4. Cost of Rx cannot be added to hospital bill. If financial assistance is needed, please contact unit  or ;   or  CANNOT provide pharmacy voucher for patients co-pays  5. Patients can then  the prescription on their way out of the hospital at discharge, or pharmacy can deliver to the bedside if staff is available. (payment due at time of pick-up or delivery - cash, check, or card accepted)     Able to afford home medications/ co-pay costs: Yes    ADLS  Support Systems: Children    PT AM-PAC:   OT AM-PAC:     [place]: Pt lives in one story house  Steps: 1-2 steps to get in    Plans to RETURN to current housing: Yes  Barriers to RETURNING to current housin [facility]  Currently ACTIVE with  [email or web address] Way: No  Home Care Agency: Not Applicable      Durable Medical Equipment  DME Provider: None  Equipment:     Home Oxygen and Respiratory Equipment  Has HOME OXYGEN prior to admission: No  [address]: Not Applicable  Other Respiratory Equipment: N/A    Dialysis  Active with HD/PD prior to admission: No  Nephrologist: [address]:  Not Applicable    DISCHARGE PLAN:  Disposition: Home with [place] [address], provider TBD    Transportation PLAN for discharge: family     Factors facilitating achievement of predicted outcomes: Family support    Barriers to discharge: Pain, recovering from surgery    Additional Case Management Notes: Pt from home alone, has Son for support, POD#1 Lumbar Fusion/Decompression, SW met with Pt to discuss possible need for HHC at [place], Pt was supportive of this plan, SW submitted referrals.      The Plan for Transition of Care is related to the following treatment goals of Spondylolisthesis of lumbar region [M43.16]  Lumbar stenosis with neurogenic claudication [M48.062]    The Patient and/or patient representative Ray Guevara and her family were provided with a choice of provider and agrees with the discharge plan Yes    Freedom of choice list was provided with basic dialogue that supports the patient's individualized plan of care/goals and shares the quality data associated with the providers.  Yes    Care Transition patient: No    CHETNA Hooks, [facility]  Case Management Department  Ph: [phone]   Fax: [phone]

## 2021-05-27 NOTE — PROGRESS NOTES
NEUROSURGERY POST-OP PROGRESS NOTE    Patient Name: Sandra Lerma YOB: 1945   Sex: Female Age: 70 yrs     Medical Record Number: [id number] Acct Number: [de-identified]   Room Number: 1646/2567-42 Hospital Day: Hospital Day: 2     Interval History:  Post-operative Day# 1 s/p ALIF L4-5 and L3-4 with PEEK interbody cages by Dr. Robbie Coats    Subjective: reports incisional pain in back greater than abdomen, no pain in legs. Objective:    VITAL SIGNS   /72   Pulse 75   Temp 97.8 °F (36.6 °C) (Oral)   Resp 16   Ht 5' 6\" (1.676 m)   Wt 213 lb (96.6 kg)   SpO2 100%   BMI 34.38 kg/m²    Height Height: 5' 6\" (167.6 cm)   Weight Weight: 213 lb (96.6 kg)        Allergies No Known Allergies   NPO Status DIET GENERAL;   Isolation Contact     LABS   Basic Metabolic Profile No results for input(s): NA, CL, CO2, BUN, CREATININE, GLUCOSE, ALB, PHOS, MG in the last 72 hours. Invalid input(s): POTASSIUM, CA   Complete Blood Count No results for input(s): WBC, RBC, HEMOGLOBIN in the last 72 hours. Invalid input(s): HEMATOCRIT   Coagulation Studies No results for input(s): PTT, INR in the last 72 hours.     Invalid input(s): PLATELETS, PROA, PT, PTTA     MEDICATIONS   Inpatient Medications   polyethylene glycol, 17 g, Oral, Daily    acetaminophen, 1,000 mg, Oral, Q6H    scopolamine, 1 patch, Transdermal, Once    pantoprazole, 40 mg, Oral, QAM AC    atorvastatin, 20 mg, Oral, Daily    sodium chloride flush, 10 mL, Intravenous, 2 times per day    methocarbamol IVPB, 1,000 mg, Intravenous, Q8H    sennosides-docusate sodium, 1 tablet, Oral, BID    heparin (porcine), 5,000 Units, Subcutaneous, 3 times per day   Infusions    sodium chloride      lactated ringers 125 mL/hr at 05/26/21 2308      Antibiotics   Recent Abx Admin                   ceFAZolin (ANCEF) 2000 mg in dextrose 3 % 50 mL IVPB (duplex) (mg) 2,000 mg New Bag 05/27/21 0633     2,000 mg New Bag 05/26/21 2320    vancomycin (VANCOCIN) injection (mg) 1,000 mg Given 05/26/21 1641    sodium chloride 0.9 % 1,000 mL with gentamicin (GARAMYCIN) 80 mg (mL) 1,000 mL Given 05/26/21 1359    ceFAZolin (ANCEF) 2000 mg in dextrose 3 % 50 mL IVPB (duplex) (mg) 2,000 mg Given 05/26/21 1356                 Neurologic Exam:    Mental status: awake/alert, oriented x 4    Musculoskeletal:   Gait: Not tested   Tone: normal  Sensory: intact to light touch throughout   Motor strength:    Right  Left    Right  Left    Deltoid  5 5  Hip Flex  5 5   Biceps  5 5  Knee Extensors  5 5   Triceps  5 5  Knee Flexors  5 5   Wrist Ext  5 5  Ankle Dorsiflex. 5 5   Wrist Flex  5 5  Ankle Plantarflex. 5 5   Handgrip  5 5  Ext Piyush Longus  5 5   Thumb Ext  5 5         Incision: abdominal and bilateral back incisions c/d/i open to air     Respiratory:  Unlabored respiratory pattern    Abdomen:   Soft, ND   Last BM preop    Cardiovascular:  Warm, well perfused    Assessment   69 yo female POD 1 s/p ALIF L4-5 and L3-4 with PEEK interbody cages by Dr. Catrina Orona:  1. Neurologic exam frequency: q 4   2. Mobility: PT/OT, activity as tolerated   3. Diet: ADAT  4. Antibiotics: ancef post op completed   5. Joe: remove   6. DVT Prophylaxis: SCDs and sq heparin  7. Bowel Regimen: senokot, glycolax   8. Pain control: tylenol, roxicodone, dilaudid  9. robaxin for muscle spasms  10. Incisional Care: cleanse daily with soap/water, pat dry with clean towel and paint with CHG swab  11. Dispo Planning: continue care on 5 tower     Patient was discussed with Dr. Corrinne Cornell who agrees with above assessment and plan. Electronically signed by:  SINDY Oseguera NP, 5/27/2021 7:27 AM   Neurosurgery Nurse Practitioner  [phone]

## 2021-05-27 NOTE — OP NOTE
progressive symptoms, however, the patient  proceeded ahead with the surgical option of L3-4 and L4-5 ALIF with decompression and posterior fusion and fixation.      DETAILS OF PROCEDURE: The patient was brought to the operating room and placed under general anesthesia. EVOKES were started. The patient had the placement of the appropriate catheters and IVs.  She was positioned supine on the OR table and all pressure points were padded.     All bony prominences were inspected and padded prior to sterile draping. The abdomen was then prepped and draped in the usual sterile fashion. Fluoroscopy was used to verify the incision over the L3-4 and L4-5 interspaces and Dr. Roberto Reeves provided anterior retroperitoneal approach to L3-4 and L4-5. Please see his OR report for the details of exposure.       Fluoroscopy verified true midline position over the L3-4 and L4-5 spaces once exposure was completed.  A #15 blade was used to incise the anterior L3-4 and L4-5 annulus. L3-4 and L4-5 discectomy was performed using pituitary rongeurs, curettes and cob dissectors. The endplates were prepped for interbody fusion with rasps. Once the disc preparation was complete, trials was used open the space and the endplates were prepped for fusion. PEEK lordotic cages were selected, filled with DBX and Infuse and impacted into the disc spaces for L3-4 and L4-5 ALIF. Position was verified with fluoroscopy. Kickout washers were placed over the L3-4 and L4-5 interspaces. The wound was then irrigated with antibiotic solution. Dr. Roberto Reeves then closed the anterior incision. Therman Narcisa were stable.   Dr. Roberto Reeves was the assistant for the anterior procedure. He provided medically necessary assistance in patient positioning, prep and drape, intraoperative exposure and critical anatomical retraction, closure and postoperative care.        The patient was then repositioned prone on a Selvin table with hip and chest padding .  All bony prominences were inspected and padded prior to sterile draping. The lumbosacral area was then prepped and draped in the usual sterile fashion.  Fluorpscopy was used to fatemeh paramedian incisions from L3-4-5 pedicle boundaries. The planned incisions were opened  with a 15 blade scalpel and extended to access the L3-4-5 levels. The AP and Lateral Biplanar fluoroscopy was used to perform to place pedicle screws.        AP and Lateral imaging was used to place pedicle screws at L3, L4 and L5 each side using paramedian techniques and Francisco exposure for direct vision and placement was confirmed with Xrays.  Excellent purchase was obtained, neuro-monitoring remained stable throughout the procedure.     I then moved to decompression.  I placed a tubular METRX retractor for direct vision L4-5 interspace. I then brought the operating room microscope into the field and used it to assist with performing microsurgical decompression. Using the high-speed Midas Nolan drill, I removed the remaining left L4 lamina and the superior articular process of L5. This allowed exposure of the L4-5 ligament and scar which was removed for total decompression.  The hypertrophic ligamentum flavum was removed with a 2 mm punch microsurgically and released from the underlying dura and exiting nerve roots for decompressive at L4-5 with total left L4-5 facetectomy. This completed decompression. The L3-4 foramen were decompressed from the ALIF.        A posterolateral arthrodesis was performed at L3-4 and L4-5  by decorticating the lateral facet, transverse processes from L3-4-5 with the high speed drill and packing the posterior lateral space with Fibergraft bone matrix soaked in BMA and surgery site autograft.  The BMA was obtained from the same incision let  iliac crest.  Rods were then introduced and locked with final tightening from L3-4-5  fixation.  A compression maneuver was then performed to lock the interbody graft in place.      The wound was then irrigated with antibiotic solution and was then closed in the usual fashion using interrupted 0 Vicryl sutures in the fascia, followed by running 4-0 Monocryl in the subcuticular layer. A drain was placed.  A Dermabond dressing was then applied. The patient was extubated in the operating room and transferred to the recovery room in stable condition. There were no complications. All needle, instrument, and sponge counts were correct.     In accordance with CMS guidelines, I attest that I was present for the entire procedure from the creation of the skin incision to the closure.     Charleen Hector, served as assistant on the posterior segment of the surgery due to the complex nature of the surgery and the lack of a resident or fellow assistant.  She provided assistance with critical tissue retraction at parts of the surgery, wound closure and assistance with protecting critical neuro elements.           ESTIMATED BLOOD NMFZ: 542 FN     COMPLICATIONS: No complications apparent.     DISPOSITION: The patient was transferred to the PACU in stable condition, awake, alert, and moving all extremities on command.     Dictated by: Rosas Kaye MD

## 2021-05-28 PROBLEM — Z98.1 S/P LUMBAR FUSION: Status: ACTIVE | Noted: 2021-05-28

## 2021-05-28 LAB
BASOPHILS ABSOLUTE: 0 K/UL (ref 0–0.2)
BASOPHILS RELATIVE PERCENT: 0.2 %
EOSINOPHILS ABSOLUTE: 0 K/UL (ref 0–0.6)
EOSINOPHILS RELATIVE PERCENT: 0.4 %
HCT VFR BLD CALC: 36.5 % (ref 36–48)
HEMOGLOBIN: 12.5 G/DL (ref 12–16)
LYMPHOCYTES ABSOLUTE: 1.4 K/UL (ref 1–5.1)
LYMPHOCYTES RELATIVE PERCENT: 14.9 %
MCH RBC QN AUTO: 32 PG (ref 26–34)
MCHC RBC AUTO-ENTMCNC: 34.1 G/DL (ref 31–36)
MCV RBC AUTO: 93.6 FL (ref 80–100)
MONOCYTES ABSOLUTE: 0.7 K/UL (ref 0–1.3)
MONOCYTES RELATIVE PERCENT: 7.6 %
NEUTROPHILS ABSOLUTE: 7 K/UL (ref 1.7–7.7)
NEUTROPHILS RELATIVE PERCENT: 76.9 %
PDW BLD-RTO: 14.3 % (ref 12.4–15.4)
PLATELET # BLD: 165 K/UL (ref 135–450)
PMV BLD AUTO: 8.1 FL (ref 5–10.5)
RBC # BLD: 3.9 M/UL (ref 4–5.2)
WBC # BLD: 9.1 K/UL (ref 4–11)

## 2021-05-28 PROCEDURE — 97530 THERAPEUTIC ACTIVITIES: CPT

## 2021-05-28 PROCEDURE — 6370000000 HC RX 637 (ALT 250 FOR IP): Performed by: PHYSICIAN ASSISTANT

## 2021-05-28 PROCEDURE — 97116 GAIT TRAINING THERAPY: CPT

## 2021-05-28 PROCEDURE — 85025 COMPLETE CBC W/AUTO DIFF WBC: CPT

## 2021-05-28 PROCEDURE — 6360000002 HC RX W HCPCS: Performed by: PHYSICIAN ASSISTANT

## 2021-05-28 PROCEDURE — 6370000000 HC RX 637 (ALT 250 FOR IP): Performed by: NEUROLOGICAL SURGERY

## 2021-05-28 PROCEDURE — 6370000000 HC RX 637 (ALT 250 FOR IP): Performed by: NURSE PRACTITIONER

## 2021-05-28 PROCEDURE — 1200000000 HC SEMI PRIVATE

## 2021-05-28 PROCEDURE — 2580000003 HC RX 258: Performed by: PHYSICIAN ASSISTANT

## 2021-05-28 PROCEDURE — 36415 COLL VENOUS BLD VENIPUNCTURE: CPT

## 2021-05-28 RX ORDER — SENNA AND DOCUSATE SODIUM 50; 8.6 MG/1; MG/1
1 TABLET, FILM COATED ORAL 2 TIMES DAILY
COMMUNITY
Start: 2021-05-28 | End: 2021-06-26

## 2021-05-28 RX ORDER — CHLORAL HYDRATE 500 MG
1000 CAPSULE ORAL DAILY
Qty: 90 CAPSULE | Refills: 3 | COMMUNITY
Start: 2021-06-09

## 2021-05-28 RX ORDER — POLYETHYLENE GLYCOL 3350 17 G/17G
17 POWDER, FOR SOLUTION ORAL DAILY
Qty: 527 G | Refills: 1 | Status: SHIPPED | OUTPATIENT
Start: 2021-05-29 | End: 2021-06-28

## 2021-05-28 RX ORDER — ASPIRIN 81 MG/1
81 TABLET ORAL DAILY
Qty: 30 TABLET | Refills: 3 | COMMUNITY
Start: 2021-06-09

## 2021-05-28 RX ORDER — BISACODYL 10 MG
10 SUPPOSITORY, RECTAL RECTAL ONCE
Status: COMPLETED | OUTPATIENT
Start: 2021-05-28 | End: 2021-05-28

## 2021-05-28 RX ORDER — PROMETHAZINE HYDROCHLORIDE 25 MG/1
1 TABLET ORAL DAILY
Refills: 0 | COMMUNITY
Start: 2021-06-09 | End: 2021-06-26

## 2021-05-28 RX ADMIN — METHOCARBAMOL 750 MG: 750 TABLET ORAL at 20:07

## 2021-05-28 RX ADMIN — Medication 10 MG: at 20:07

## 2021-05-28 RX ADMIN — ACETAMINOPHEN 1000 MG: 500 TABLET ORAL at 17:30

## 2021-05-28 RX ADMIN — ACETAMINOPHEN 1000 MG: 500 TABLET ORAL at 09:30

## 2021-05-28 RX ADMIN — ACETAMINOPHEN 1000 MG: 500 TABLET ORAL at 03:11

## 2021-05-28 RX ADMIN — Medication 10 ML: at 09:24

## 2021-05-28 RX ADMIN — ACETAMINOPHEN 1000 MG: 500 TABLET ORAL at 22:46

## 2021-05-28 RX ADMIN — BISACODYL 10 MG: 10 SUPPOSITORY RECTAL at 09:23

## 2021-05-28 RX ADMIN — OXYCODONE 10 MG: 5 TABLET ORAL at 16:57

## 2021-05-28 RX ADMIN — HEPARIN SODIUM 5000 UNITS: 5000 INJECTION INTRAVENOUS; SUBCUTANEOUS at 05:23

## 2021-05-28 RX ADMIN — ATORVASTATIN CALCIUM 20 MG: 20 TABLET, FILM COATED ORAL at 09:23

## 2021-05-28 RX ADMIN — POLYETHYLENE GLYCOL 3350 17 G: 17 POWDER, FOR SOLUTION ORAL at 09:23

## 2021-05-28 RX ADMIN — PANTOPRAZOLE SODIUM 40 MG: 40 TABLET, DELAYED RELEASE ORAL at 05:23

## 2021-05-28 RX ADMIN — DOCUSATE SODIUM 50 MG AND SENNOSIDES 8.6 MG 1 TABLET: 8.6; 5 TABLET, FILM COATED ORAL at 20:07

## 2021-05-28 RX ADMIN — DOCUSATE SODIUM 50 MG AND SENNOSIDES 8.6 MG 1 TABLET: 8.6; 5 TABLET, FILM COATED ORAL at 09:23

## 2021-05-28 RX ADMIN — Medication 10 ML: at 20:08

## 2021-05-28 RX ADMIN — HEPARIN SODIUM 5000 UNITS: 5000 INJECTION INTRAVENOUS; SUBCUTANEOUS at 20:07

## 2021-05-28 RX ADMIN — IBUPROFEN 800 MG: 600 TABLET ORAL at 20:07

## 2021-05-28 RX ADMIN — HEPARIN SODIUM 5000 UNITS: 5000 INJECTION INTRAVENOUS; SUBCUTANEOUS at 17:27

## 2021-05-28 ASSESSMENT — PAIN SCALES - GENERAL
PAINLEVEL_OUTOF10: 0
PAINLEVEL_OUTOF10: 0
PAINLEVEL_OUTOF10: 3
PAINLEVEL_OUTOF10: 0
PAINLEVEL_OUTOF10: 7
PAINLEVEL_OUTOF10: 1
PAINLEVEL_OUTOF10: 4

## 2021-05-28 ASSESSMENT — PAIN DESCRIPTION - ORIENTATION
ORIENTATION: ANTERIOR;POSTERIOR
ORIENTATION: ANTERIOR;POSTERIOR

## 2021-05-28 ASSESSMENT — PAIN DESCRIPTION - PAIN TYPE
TYPE: SURGICAL PAIN
TYPE: SURGICAL PAIN

## 2021-05-28 ASSESSMENT — PAIN DESCRIPTION - ONSET
ONSET: AWAKENED FROM SLEEP
ONSET: AWAKENED FROM SLEEP

## 2021-05-28 ASSESSMENT — PAIN DESCRIPTION - LOCATION
LOCATION: ABDOMEN;BACK
LOCATION: BACK;ABDOMEN

## 2021-05-28 ASSESSMENT — PAIN DESCRIPTION - PROGRESSION
CLINICAL_PROGRESSION: GRADUALLY WORSENING
CLINICAL_PROGRESSION: GRADUALLY WORSENING

## 2021-05-28 ASSESSMENT — PAIN DESCRIPTION - DESCRIPTORS
DESCRIPTORS: ACHING;STABBING
DESCRIPTORS: ACHING;STABBING

## 2021-05-28 ASSESSMENT — PAIN DESCRIPTION - FREQUENCY
FREQUENCY: CONTINUOUS
FREQUENCY: CONTINUOUS

## 2021-05-28 ASSESSMENT — PAIN - FUNCTIONAL ASSESSMENT
PAIN_FUNCTIONAL_ASSESSMENT: PREVENTS OR INTERFERES SOME ACTIVE ACTIVITIES AND ADLS
PAIN_FUNCTIONAL_ASSESSMENT: PREVENTS OR INTERFERES SOME ACTIVE ACTIVITIES AND ADLS

## 2021-05-28 NOTE — PROGRESS NOTES
Discharge Summary    Date of Admission: 5/26/2021 10:59 AM  Date of Discharge: 5/28/2021  Admission Diagnosis: Spondylolisthesis of lumbar region [M43.16] Lumbar stenosis with neurogenic claudication [M48.062]  Discharge Diagnosis: Same   Condition on Discharge: good  Attending for Admission: Marcos Pickett MD  Procedures: Procedure(s) (LRB):  L3-4, L4-5 ANTERIOR LUMBAR INTERBODY FUSION WITH POSTERIOR LUMBAR L3-4. L4-5 DECOMPRESSION FUSION FIXATION USING PARAMEDIAN TECHNIQUES (N/A)  . (N/A)  Consults: None    Reason for Admission:  Cecilia Mercer is a 70 y.o. female patient who was admitted to the hospital for complaints of back pain and bilateral radicular leg pain. She underwent the procedure listed above on 5/26/2021. Hospital Course:  After surgery, Her pre-operative leg pain was Absent . She complained of surgical incision pain. The pain was well-controlled on oral medications. Her brace was in place. The incision was clean, dry and intact. There was no erythema or edema around the surgical site. Prior to discharge She was eating well, urinating and ambulating with a steady gait.     Discharge Vitals/Labs:  /64   Pulse 76   Temp 98.7 °F (37.1 °C) (Oral)   Resp 16   Ht 5' 5.98\" (1.676 m)   Wt 213 lb (96.6 kg)   SpO2 98%   BMI 34.40 kg/m²   CBC with Differential:    Lab Results   Component Value Date    WBC 9.1 05/28/2021    RBC 3.90 05/28/2021    HGB 12.5 05/28/2021    HCT 36.5 05/28/2021     05/28/2021    MCV 93.6 05/28/2021    MCH 32.0 05/28/2021    MCHC 34.1 05/28/2021    RDW 14.3 05/28/2021    LYMPHOPCT 14.9 05/28/2021    MONOPCT 7.6 05/28/2021    BASOPCT 0.2 05/28/2021    MONOSABS 0.7 05/28/2021    LYMPHSABS 1.4 05/28/2021    EOSABS 0.0 05/28/2021    BASOSABS 0.0 05/28/2021     CMP:    Lab Results   Component Value Date     09/30/2011    K 4.3 09/30/2011     09/30/2011    CO2 28 09/30/2011    BUN 18 09/30/2011    CREATININE 0.8 09/30/2011    GFRAA >60 09/30/2011 fish oil 1000 MG Caps  Take 1 capsule by mouth daily  Start taking on: June 9, 2021  What changed:   · when to take this  · These instructions start on June 9, 2021. If you are unsure what to do until then, ask your doctor or other care provider. Multiple Vitamins-Iron Tabs  Take 1 tablet by mouth daily  Start taking on: June 9, 2021  What changed: These instructions start on June 9, 2021. If you are unsure what to do until then, ask your doctor or other care provider. CONTINUE taking these medications    ascorbic acid 500 MG tablet  Commonly known as: VITAMIN C     melatonin 10 MG Caps capsule     METAMUCIL PO     omeprazole 20 MG delayed release capsule  Commonly known as: PRILOSEC     simvastatin 40 MG tablet  Commonly known as: ZOCOR     TYLENOL PO        STOP taking these medications    celecoxib 200 MG capsule  Commonly known as: CELEBREX           Where to Get Your Medications      You can get these medications from any pharmacy    Bring a paper prescription for each of these medications  · docusate sodium 100 MG capsule  · methocarbamol 500 MG tablet  · ondansetron 4 MG disintegrating tablet  · oxyCODONE-acetaminophen 5-325 MG per tablet  · polyethylene glycol 17 g packet  You don't need a prescription for these medications  · aspirin 81 MG EC tablet  · fish oil 1000 MG Caps  · Multiple Vitamins-Iron Tabs  · sennosides-docusate sodium 8.6-50 MG tablet         Discharge Destination:  The patient was discharged to Home. Follow-up:  The patient is to follow-up with Kandi Noyola MD in the office in 2 weeks      Discharge Instructions:   Verbal and written discharge instructions were given to the patient at the time of discharge. Electronically signed by:  SINDY Cabrera NP, APRN-CNP, 5/28/2021 12:42 PM  [phone]

## 2021-05-28 NOTE — PROGRESS NOTES
Patient is alert and oriented x4. Denies n/v. Tolerating diet and fluids well. Ambulates to bathroom with walker and gait belt. States that pain worsens while she stands up to go to bathroom. Medicated per MAR. Patient has intermittent low grade fevers.

## 2021-05-28 NOTE — PROGRESS NOTES
NEUROSURGERY POST-OP PROGRESS NOTE    Patient Name: Kacie Baird YOB: 1945   Sex: Female Age: 70 yrs     Medical Record Number: [id number] Acct Number: [de-identified]   Room Number: 1318/0278-30 Hospital Day: Hospital Day: 3     Interval History:  Post-operative Day# 2 s/p ALIF L4-5 and L3-4 with PEEK interbody cages by Dr. Rhianna Carrillo    Subjective: good pain control overnight, has pain in abdominal incision with movement/ambulation. No pain in legs. Objective:    VITAL SIGNS   BP 99/62   Pulse 65   Temp 98.2 °F (36.8 °C) (Oral)   Resp 14   Ht 5' 5.98\" (1.676 m)   Wt 213 lb (96.6 kg)   SpO2 98%   BMI 34.40 kg/m²    Height Height: 5' 5.98\" (167.6 cm)   Weight Weight: 213 lb (96.6 kg)        Allergies No Known Allergies   NPO Status DIET GENERAL;   Isolation No active isolations     LABS   Basic Metabolic Profile No results for input(s): NA, CL, CO2, BUN, CREATININE, GLUCOSE, ALB, PHOS, MG in the last 72 hours. Invalid input(s): POTASSIUM, CA   Complete Blood Count No results for input(s): WBC, RBC, HEMOGLOBIN in the last 72 hours. Invalid input(s): HEMATOCRIT   Coagulation Studies No results for input(s): PTT, INR in the last 72 hours. Invalid input(s): PLATELETS, PROA, PT, PTTA     MEDICATIONS   Inpatient Medications   polyethylene glycol, 17 g, Oral, Daily    acetaminophen, 1,000 mg, Oral, Q6H    scopolamine, 1 patch, Transdermal, Once    pantoprazole, 40 mg, Oral, QAM AC    atorvastatin, 20 mg, Oral, Daily    sodium chloride flush, 10 mL, Intravenous, 2 times per day    sennosides-docusate sodium, 1 tablet, Oral, BID    heparin (porcine), 5,000 Units, Subcutaneous, 3 times per day   Infusions    sodium chloride        Antibiotics   Recent Abx Admin      No antibiotic orders with administrations found.                  Neurologic Exam:    Mental status: awake/alert, oriented x 4    Musculoskeletal:   Gait: Not tested   Tone: normal  Sensory: intact to light touch throughout Motor strength:    Right  Left    Right  Left    Deltoid  5 5  Hip Flex  5 5   Biceps  5 5  Knee Extensors  5 5   Triceps  5 5  Knee Flexors  5 5   Wrist Ext  5 5  Ankle Dorsiflex. 5 5   Wrist Flex  5 5  Ankle Plantarflex. 5 5   Handgrip  5 5  Ext Piyush Longus  5 5   Thumb Ext  5 5         Incision: abdominal and bilateral back incisions c/d/i open to air     Respiratory:  Unlabored respiratory pattern    Abdomen:   Soft, ND   Last BM preop, passing gas, bowel sounds active     Cardiovascular:  Warm, well perfused    Assessment   71 yo female POD 2 s/p ALIF L4-5 and L3-4 with PEEK interbody cages by Dr. Richard Reeves:  1. Neurologic exam frequency: q 4   2. Mobility: PT/OT, activity as tolerated   3. Diet: ADAT  4. DVT Prophylaxis: SCDs and sq heparin  5. Bowel Regimen: senokot, glycolax   6. Pain control: tylenol, roxicodone  7. robaxin for muscle spasms  8. Incisional Care: cleanse daily with soap/water, pat dry with clean towel and paint with CHG swab  9. Dispo Planning: home today vs tmw pending progress    Patient was discussed with Dr. Asher Feliciano who agrees with above assessment and plan. Electronically signed by:  Judieth Schwab, APRN - NP, 5/28/2021 7:30 AM   Neurosurgery Nurse Practitioner  [phone]

## 2021-05-28 NOTE — CARE COORDINATION
Case Management Assessment            Discharge Note                    Date / Time of Note: 5/28/2021 3:00 PM                  Discharge Note Completed by: CHETNA Boyd LSW    Patient Name: Usama Ledesma   YOB: 1945  Diagnosis: Spondylolisthesis of lumbar region [M43.16]  Lumbar stenosis with neurogenic claudication [M48.062]   Date / Time: 5/26/2021 10:59 AM    Current PCP: Jalil CARUSO patient: Yes    Hospitalization in the last 30 days: No    Advance Directives:  Code Status: Full Code  [place] DNR form completed and on chart: No    Financial:  Payor: Mignon Lange / Plan: [address] / Product Type: *No Product type* /      Pharmacy:    [id number] [address] [place] [phone]  Shane Escobar [address] [place] [address]  Phone: [phone] Fax: [phone]      Assistance purchasing medications?: Potential Assistance Purchasing Medications: No  Assistance provided by Case Management: None at this time    Does patient want to participate in local refill/ meds to beds program?: Not Assessed    Meds To Beds General Rules:  1. Can ONLY be done Monday- Friday between 8:30am-5pm  2. Prescription(s) must be in pharmacy by 3pm to be filled same day  3. Copy of patient's insurance/ prescription drug card and patient face sheet must be sent along with the prescription(s)  4. Cost of Rx cannot be added to hospital bill. If financial assistance is needed, please contact unit  or ;  or  CANNOT provide pharmacy voucher for patients co-pays  5.  Patients can then  the prescription on their way out of the hospital at discharge, or pharmacy can deliver to the bedside if staff is available. (payment due at time of pick-up or delivery - cash, check, or card accepted)     Able to afford home medications/ co-pay costs: Yes    ADLS:  Current PT

## 2021-05-28 NOTE — DISCHARGE INSTR - COC
05/28/21 Lokesh Garcia, RN    No isolation required for MRSA colonization @ [facility]          Nurse Assessment:  Last Vital Signs: /64   Pulse 76   Temp 98.7 °F (37.1 °C) (Oral)   Resp 16   Ht 5' 5.98\" (1.676 m)   Wt 213 lb (96.6 kg)   SpO2 98%   BMI 34.40 kg/m²     Last documented pain score (0-10 scale): Pain Level: 1  Last Weight:   Wt Readings from Last 1 Encounters:   05/27/21 213 lb (96.6 kg)     Mental Status:  {IP PT MENTAL STATUS:20030}    IV Access:  { CARMEN IV ACCESS:062997535}    Nursing Mobility/ADLs:  Walking   {CHP DME STYI:198729157}  Transfer  {CHP DME JSYH:534195790}  Bathing  {CHP DME JGFK:629706135}  Dressing  {P DME HMPH:798487292}  Toileting  {P DME KQWP:856884763}  Feeding  {P DME FBGJ:423816747}  Med Admin  {[facility] DME MNXL:235683544}  Med Delivery   { CARMEN MED Delivery:045068057}    Wound Care Documentation and Therapy:        Elimination:  Continence:   · Bowel: {YES / JA:13934}  · Bladder: {YES / YM:88917}  Urinary Catheter: {Urinary Catheter:423324909}   Colostomy/Ileostomy/Ileal Conduit: {YES / PM:97196}       Date of Last BM: ***    Intake/Output Summary (Last 24 hours) at 5/28/2021 1422  Last data filed at 5/28/2021 0819  Gross per 24 hour   Intake 240 ml   Output --   Net 240 ml     I/O last 3 completed shifts:   In: 18 [P.O.:480]  Out: 225 [Urine:225]    Safety Concerns:     [address] Safety Concerns:727011557}    Impairments/Disabilities:      [address] Impairments/Disabilities:230772183}    Nutrition Therapy:  Current Nutrition Therapy:   [address] Diet List:169460884}    Routes of Feeding: {P DME Other Feedings:874778150}  Liquids: {Slp liquid thickness:62245}  Daily Fluid Restriction: {CHP DME Yes amt example:900620884}  Last Modified Barium Swallow with Video (Video Swallowing Test): {Done Not Done CAMR:485961191}    Treatments at the Time of Hospital Discharge:   Respiratory Treatments: ***  Oxygen Therapy:  {Therapy; copd oxygen:78004}  Ventilator:    { CC Vent RKGO:907967152}    Rehab Therapies: Physical Therapy and Occupational Therapy  Weight Bearing Status/Restrictions: { CC Weight Bearin}  Other Medical Equipment (for information only, NOT a DME order):  {EQUIPMENT:486366282}  Other Treatments: ***    Patient's personal belongings (please select all that are sent with patient):  {CHP DME Belongings:581516903}    RN SIGNATURE:  {Esignature:230091425}    CASE MANAGEMENT/SOCIAL WORK SECTION    Inpatient Status Date: ***    Readmission Risk Assessment Score:  Readmission Risk              Risk of Unplanned Readmission:  8           Discharging to Facility/ Agency   · Name:   · Address:  · Phone:  · Fax:    Dialysis Facility (if applicable)   · Name:  · Address:  · Dialysis Schedule:  · Phone:  · Fax:    / signature: {Esignature:045776437}    PHYSICIAN SECTION    Prognosis: {Prognosis:4442926698}    Condition at Discharge: [address] [place] [address] Patient Condition:322471991}    Rehab Potential (if transferring to Rehab): {Prognosis:2383150589}    Recommended Labs or Other Treatments After Discharge: ***    Physician Certification: I certify the above information and transfer of Edgardo Mcclain  is necessary for the continuing treatment of the diagnosis listed and that she requires {Admit to Appropriate Level of Care:05606} for {GREATER/LESS:119590929} 30 days.      Update Admission H&P: {CHP DME Changes in HGXEN:884409985}    PHYSICIAN SIGNATURE:  {Esignature:476128608}

## 2021-05-28 NOTE — PROGRESS NOTES
5/26/2021). Restrictions  Position Activity Restriction  Other position/activity restrictions: LSO brace. Wear when out of bed. Remove to sleep and shower. Apply when dangling. Subjective   General  Chart Reviewed: Yes  Additional Pertinent Hx: PMH: spinal stenosis, lumbar laminectomy 1/2020. Pt admitted for: L3-4, L4-5 ANTERIOR LUMBAR INTERBODY FUSION WITH POSTERIOR LUMBAR L3-4. L4-5 DECOMPRESSION FUSION FIXATION USING PARAMEDIAN TECHNIQUES performed on 5/26. Response To Previous Treatment: Patient with no complaints from previous session. Family / Caregiver Present: No  Referring Practitioner: Jeramie  Subjective  Subjective: Patient agreeable to PT treatment, reports possible d/c home later today.   General Comment  Comments: Patient supine in bed upon arrival.  Pain Screening  Patient Currently in Pain: Yes (4/10 surgical pain following mobility, RN aware)  Vital Signs  Patient Currently in Pain: Yes (4/10 surgical pain following mobility, RN aware)       Orientation  Orientation  Overall Orientation Status: Within Normal Limits  Cognition   Cognition  Overall Cognitive Status: WNL  Objective   Bed mobility  Supine to Sit: Supervision (head of bed flat)  Scooting: Supervision (to EOB)  Comment: patient donned and doffered LSO brace independently upon standing/prior to sitting  Transfers  Sit to Stand: Supervision (from EOB and from toilet)  Stand to sit: Supervision (to toilet and to EOB)  Ambulation  Ambulation?: Yes  Ambulation 1  Surface: level tile  Device: Rolling Walker  Assistance: Supervision  Quality of Gait: slightly decreased loni, steady gait with no loss of balance noted, reciprocal pattern  Distance: 10' into bathroom, 350' in room and hallways returning to bedside chair  Stairs/Curb  Stairs?: No (patient reports no concerns regarding curb step upon d/c)     Balance  Sitting - Static: Good  Standing - Static: Fair;+  Standing - Dynamic: Fair;+ (supervision to ambulate with FWW) OutComes Score                                                     AM-PAC Score  AM-PAC Inpatient Mobility Raw Score : 19 (05/28/21 1126)  AM-PAC Inpatient T-Scale Score : 45.44 (05/28/21 1126)  Mobility Inpatient CMS 0-100% Score: 41.77 (05/28/21 1126)  Mobility Inpatient CMS G-Code Modifier : CK (05/28/21 1126)          Goals  Short term goals  Time Frame for Short term goals: D/C  Short term goal 1: supine<->sit IND via logroll - ongoing 5/28  Short term goal 2: sit<->stand SUPV - goal met 5/28; new goal for patient to transfer sit<>stand with modified independence  Short term goal 3: Amb 150 ft with RW SUPV; goal met 5/28; new goal for patient to ambulate 350' with FWW and modified independence  Patient Goals   Patient goals : to go home    Plan    Plan  Times per week: 5-7  Current Treatment Recommendations: Functional Mobility Training, Transfer Training, Gait Training, Stair training, Safety Education & Training  Safety Devices  Type of devices: Left in chair, Chair alarm in place, Call light within reach, Nurse notified, Gait belt     Therapy Time   Individual Concurrent Group Co-treatment   Time In 1039         Time Out 1104         Minutes 25         Timed Code Treatment Minutes: 25 Minutes     If patient discharges prior to next session this note will serve as a discharge summary. Please see below for the latest assessment towards goals.    Tammy GUTIÉRREZ [address]

## 2021-05-29 VITALS
SYSTOLIC BLOOD PRESSURE: 139 MMHG | WEIGHT: 213 LBS | RESPIRATION RATE: 16 BRPM | HEART RATE: 75 BPM | DIASTOLIC BLOOD PRESSURE: 80 MMHG | TEMPERATURE: 98.2 F | OXYGEN SATURATION: 97 % | HEIGHT: 66 IN | BODY MASS INDEX: 34.23 KG/M2

## 2021-05-29 PROCEDURE — 6370000000 HC RX 637 (ALT 250 FOR IP): Performed by: NURSE PRACTITIONER

## 2021-05-29 PROCEDURE — 6370000000 HC RX 637 (ALT 250 FOR IP): Performed by: PHYSICIAN ASSISTANT

## 2021-05-29 PROCEDURE — 2580000003 HC RX 258: Performed by: PHYSICIAN ASSISTANT

## 2021-05-29 PROCEDURE — 6360000002 HC RX W HCPCS: Performed by: PHYSICIAN ASSISTANT

## 2021-05-29 RX ADMIN — METHOCARBAMOL 750 MG: 750 TABLET ORAL at 05:41

## 2021-05-29 RX ADMIN — DOCUSATE SODIUM 50 MG AND SENNOSIDES 8.6 MG 1 TABLET: 8.6; 5 TABLET, FILM COATED ORAL at 09:03

## 2021-05-29 RX ADMIN — PANTOPRAZOLE SODIUM 40 MG: 40 TABLET, DELAYED RELEASE ORAL at 05:41

## 2021-05-29 RX ADMIN — ACETAMINOPHEN 1000 MG: 500 TABLET ORAL at 09:04

## 2021-05-29 RX ADMIN — ATORVASTATIN CALCIUM 20 MG: 20 TABLET, FILM COATED ORAL at 09:03

## 2021-05-29 RX ADMIN — Medication 10 ML: at 09:04

## 2021-05-29 RX ADMIN — OXYCODONE 5 MG: 5 TABLET ORAL at 12:14

## 2021-05-29 RX ADMIN — OXYCODONE 10 MG: 5 TABLET ORAL at 05:41

## 2021-05-29 RX ADMIN — POLYETHYLENE GLYCOL 3350 17 G: 17 POWDER, FOR SOLUTION ORAL at 09:04

## 2021-05-29 RX ADMIN — HEPARIN SODIUM 5000 UNITS: 5000 INJECTION INTRAVENOUS; SUBCUTANEOUS at 05:41

## 2021-05-29 ASSESSMENT — PAIN SCALES - GENERAL
PAINLEVEL_OUTOF10: 0
PAINLEVEL_OUTOF10: 7
PAINLEVEL_OUTOF10: 4
PAINLEVEL_OUTOF10: 3

## 2021-05-29 ASSESSMENT — PAIN DESCRIPTION - FREQUENCY: FREQUENCY: CONTINUOUS

## 2021-05-29 ASSESSMENT — PAIN DESCRIPTION - ONSET: ONSET: ON-GOING

## 2021-05-29 ASSESSMENT — PAIN DESCRIPTION - PAIN TYPE: TYPE: SURGICAL PAIN

## 2021-05-29 ASSESSMENT — PAIN DESCRIPTION - DESCRIPTORS: DESCRIPTORS: ACHING

## 2021-05-29 ASSESSMENT — PAIN DESCRIPTION - ORIENTATION: ORIENTATION: POSTERIOR

## 2021-05-29 ASSESSMENT — PAIN - FUNCTIONAL ASSESSMENT: PAIN_FUNCTIONAL_ASSESSMENT: PREVENTS OR INTERFERES SOME ACTIVE ACTIVITIES AND ADLS

## 2021-05-29 ASSESSMENT — PAIN DESCRIPTION - PROGRESSION: CLINICAL_PROGRESSION: GRADUALLY WORSENING

## 2021-05-29 ASSESSMENT — PAIN DESCRIPTION - LOCATION: LOCATION: BACK

## 2021-05-29 NOTE — PROGRESS NOTES
Patient discharged. Taken to private vehicle via wheelchair. Written and verbal discharge instructions given to patient. Patient verbalized understanding. Prescriptions also given. IV discontinued. Tip intact and 2 x 2 pressure dressing applied. Patient belongings sent with patient.

## 2021-05-29 NOTE — PLAN OF CARE
Problem: Pain:  Goal: Pain level will decrease  Description: Pain level will decrease  5/29/2021 0754 by Chemo Martinez RN  Outcome: Ongoing  5/29/2021 0102 by Nick Sexton RN  Outcome: Ongoing     Problem: Falls - Risk of:  Goal: Will remain free from falls  Description: Will remain free from falls  5/29/2021 0754 by Chemo Martinez RN  Outcome: Ongoing  5/29/2021 0102 by Nick Sexton RN  Outcome: Ongoing

## 2021-05-29 NOTE — PROGRESS NOTES
Patient with high oral temp, contacted on call neuro surgery. Medicated patient per order. Will continue to monitor.

## 2021-05-31 NOTE — DISCHARGE SUMMARY
09/30/2011    GFRAA >60 09/30/2011    AGRATIO 1.3 09/30/2011    GLUCOSE 92 09/30/2011    PROT 7.5 09/30/2011    LABALBU 4.3 09/30/2011    CALCIUM 9.6 09/30/2011    BILITOT 0.70 09/30/2011    ALKPHOS 84 09/30/2011    AST 21 09/30/2011    ALT 15 09/30/2011       Discharge Medications: The patient suffers from a major neurological surgery that pain cannot be managed within an average of 30 MED per day. Severe acute postoperative pain is the reason for exceeding the 30 MED average, and the prescription reflects the same dosage patient received while inpatient, which is the lowest dose consistent with the patients medical condition. Non-opioid treatment options have been considered prior to prescribing opioids, and the patient has been advised of the benefits and risks of the opioid (including the potential for addiction). Medication List      START taking these medications    docusate sodium 100 MG capsule  Commonly known as: COLACE  Take 1 capsule by mouth 2 times daily     methocarbamol 500 MG tablet  Commonly known as: Robaxin  Take 1-2 tablets by mouth every 6 hours as needed (spasm)     ondansetron 4 MG disintegrating tablet  Commonly known as: ZOFRAN-ODT  Take 1 tablet by mouth 3 times daily as needed for Nausea or Vomiting     oxyCODONE-acetaminophen 5-325 MG per tablet  Commonly known as: Percocet  Take 1-2 tablets by mouth every 4 hours as needed for Pain for up to 7 days. Intended supply: 7 days. Take lowest dose possible to manage pain     polyethylene glycol 17 g packet  Commonly known as: GLYCOLAX  Take 17 g by mouth daily     sennosides-docusate sodium 8.6-50 MG tablet  Commonly known as: SENOKOT-S  Take 1 tablet by mouth 2 times daily        CHANGE how you take these medications    aspirin 81 MG EC tablet  Take 1 tablet by mouth daily  Start taking on: June 9, 2021  What changed: These instructions start on June 9, 2021.  If you are unsure what to do until then, ask your doctor or other care

## 2021-06-02 NOTE — PROGRESS NOTES
Physician Progress Note      PATIENT:               Serina Valle  CSN #:                  [id number]  :                       1945  ADMIT DATE:       2021 10:59 AM  [address] [place] DATE:        2021 2:34 PM  RESPONDING  PROVIDER #:        Sneha Willis          QUERY TEXT:    Patient admitted s/p fusion  and noted to have SIRS WBC 12.4, temp 100.9, HR   99 on . If possible, please document in progress notes and discharge   summary if you are evaluating and/or treating any of the following: The medical record reflects the following:  Risk Factors: s/p fusion   Clinical Indicators: patient is s/p fusion on . On , WBC 12.4, TEMP   100.9, HR 99. Per nursing documentation: Patient with a high tempature this shift, contacted   MD due to being maxed out on tylenol. Medicated patient per mar and educated   on IS. Treatment: Tylenol  Options provided:  -- SIRS of non-infectious origin without acute organ dysfunction  -- Other - I will add my own diagnosis  -- Disagree - Not applicable / Not valid  -- Disagree - Clinically unable to determine / Unknown  -- Refer to Clinical Documentation Reviewer    PROVIDER RESPONSE TEXT:    This patient has SIRS of non-infectious origin without acute organ   dysfunction.     Query created by: Isha Gauthier on 2021 6:50 AM      Electronically signed by:  Sneha Willis 2021 8:34 AM

## 2021-06-26 ENCOUNTER — HOSPITAL ENCOUNTER (INPATIENT)
Age: 76
LOS: 2 days | Discharge: HOME HEALTH | DRG: 857 | End: 2021-06-28
Attending: EMERGENCY MEDICINE | Admitting: INTERNAL MEDICINE
Payer: MEDICARE

## 2021-06-26 ENCOUNTER — APPOINTMENT (OUTPATIENT)
Dept: CT IMAGING | Age: 76
DRG: 857 | End: 2021-06-26
Payer: MEDICARE

## 2021-06-26 DIAGNOSIS — L03.311 CELLULITIS OF ABDOMINAL WALL: Primary | ICD-10-CM

## 2021-06-26 PROBLEM — T81.49XA SURGICAL SITE INFECTION: Status: ACTIVE | Noted: 2021-06-26

## 2021-06-26 LAB
ANION GAP SERPL CALCULATED.3IONS-SCNC: 13 MMOL/L (ref 3–16)
BASOPHILS ABSOLUTE: 0 K/UL (ref 0–0.2)
BASOPHILS RELATIVE PERCENT: 0.6 %
BUN BLDV-MCNC: 21 MG/DL (ref 7–20)
CALCIUM SERPL-MCNC: 9.4 MG/DL (ref 8.3–10.6)
CHLORIDE BLD-SCNC: 100 MMOL/L (ref 99–110)
CO2: 23 MMOL/L (ref 21–32)
CREAT SERPL-MCNC: 0.9 MG/DL (ref 0.6–1.2)
EOSINOPHILS ABSOLUTE: 0.1 K/UL (ref 0–0.6)
EOSINOPHILS RELATIVE PERCENT: 2.5 %
GFR AFRICAN AMERICAN: >60
GFR NON-AFRICAN AMERICAN: >60
GLUCOSE BLD-MCNC: 121 MG/DL (ref 70–99)
HCT VFR BLD CALC: 41.2 % (ref 36–48)
HEMOGLOBIN: 13.5 G/DL (ref 12–16)
LACTIC ACID: 2.2 MMOL/L (ref 0.4–2)
LYMPHOCYTES ABSOLUTE: 1.2 K/UL (ref 1–5.1)
LYMPHOCYTES RELATIVE PERCENT: 20.2 %
MCH RBC QN AUTO: 31.4 PG (ref 26–34)
MCHC RBC AUTO-ENTMCNC: 32.8 G/DL (ref 31–36)
MCV RBC AUTO: 96 FL (ref 80–100)
MONOCYTES ABSOLUTE: 0.4 K/UL (ref 0–1.3)
MONOCYTES RELATIVE PERCENT: 7.2 %
NEUTROPHILS ABSOLUTE: 4 K/UL (ref 1.7–7.7)
NEUTROPHILS RELATIVE PERCENT: 69.5 %
PDW BLD-RTO: 14.7 % (ref 12.4–15.4)
PLATELET # BLD: 245 K/UL (ref 135–450)
PMV BLD AUTO: 8.4 FL (ref 5–10.5)
POTASSIUM REFLEX MAGNESIUM: 4.3 MMOL/L (ref 3.5–5.1)
RBC # BLD: 4.29 M/UL (ref 4–5.2)
SODIUM BLD-SCNC: 136 MMOL/L (ref 136–145)
WBC # BLD: 5.8 K/UL (ref 4–11)

## 2021-06-26 PROCEDURE — 1200000000 HC SEMI PRIVATE

## 2021-06-26 PROCEDURE — 87077 CULTURE AEROBIC IDENTIFY: CPT

## 2021-06-26 PROCEDURE — 87075 CULTR BACTERIA EXCEPT BLOOD: CPT

## 2021-06-26 PROCEDURE — 87040 BLOOD CULTURE FOR BACTERIA: CPT

## 2021-06-26 PROCEDURE — 36415 COLL VENOUS BLD VENIPUNCTURE: CPT

## 2021-06-26 PROCEDURE — 0J980ZZ DRAINAGE OF ABDOMEN SUBCUTANEOUS TISSUE AND FASCIA, OPEN APPROACH: ICD-10-PCS | Performed by: STUDENT IN AN ORGANIZED HEALTH CARE EDUCATION/TRAINING PROGRAM

## 2021-06-26 PROCEDURE — 96365 THER/PROPH/DIAG IV INF INIT: CPT

## 2021-06-26 PROCEDURE — 99221 1ST HOSP IP/OBS SF/LOW 40: CPT | Performed by: SURGERY

## 2021-06-26 PROCEDURE — 99285 EMERGENCY DEPT VISIT HI MDM: CPT

## 2021-06-26 PROCEDURE — 6370000000 HC RX 637 (ALT 250 FOR IP): Performed by: PHYSICIAN ASSISTANT

## 2021-06-26 PROCEDURE — 2580000003 HC RX 258: Performed by: PHYSICIAN ASSISTANT

## 2021-06-26 PROCEDURE — 6360000004 HC RX CONTRAST MEDICATION: Performed by: EMERGENCY MEDICINE

## 2021-06-26 PROCEDURE — 74177 CT ABD & PELVIS W/CONTRAST: CPT

## 2021-06-26 PROCEDURE — 2580000003 HC RX 258: Performed by: INTERNAL MEDICINE

## 2021-06-26 PROCEDURE — 6370000000 HC RX 637 (ALT 250 FOR IP): Performed by: INTERNAL MEDICINE

## 2021-06-26 PROCEDURE — 86403 PARTICLE AGGLUT ANTBDY SCRN: CPT

## 2021-06-26 PROCEDURE — 83605 ASSAY OF LACTIC ACID: CPT

## 2021-06-26 PROCEDURE — 87205 SMEAR GRAM STAIN: CPT

## 2021-06-26 PROCEDURE — 85025 COMPLETE CBC W/AUTO DIFF WBC: CPT

## 2021-06-26 PROCEDURE — 87070 CULTURE OTHR SPECIMN AEROBIC: CPT

## 2021-06-26 PROCEDURE — 87186 SC STD MICRODIL/AGAR DIL: CPT

## 2021-06-26 PROCEDURE — 80048 BASIC METABOLIC PNL TOTAL CA: CPT

## 2021-06-26 PROCEDURE — 6360000002 HC RX W HCPCS: Performed by: INTERNAL MEDICINE

## 2021-06-26 PROCEDURE — 6360000002 HC RX W HCPCS: Performed by: PHYSICIAN ASSISTANT

## 2021-06-26 RX ORDER — SULFAMETHOXAZOLE AND TRIMETHOPRIM 800; 160 MG/1; MG/1
1 TABLET ORAL 2 TIMES DAILY
COMMUNITY

## 2021-06-26 RX ORDER — MECOBALAMIN 5000 MCG
10 TABLET,DISINTEGRATING ORAL NIGHTLY PRN
Status: DISCONTINUED | OUTPATIENT
Start: 2021-06-26 | End: 2021-06-28 | Stop reason: HOSPADM

## 2021-06-26 RX ORDER — LIDOCAINE HYDROCHLORIDE AND EPINEPHRINE 10; 10 MG/ML; UG/ML
20 INJECTION, SOLUTION INFILTRATION; PERINEURAL ONCE
Status: DISCONTINUED | OUTPATIENT
Start: 2021-06-26 | End: 2021-06-28 | Stop reason: HOSPADM

## 2021-06-26 RX ORDER — SODIUM CHLORIDE 0.9 % (FLUSH) 0.9 %
5-40 SYRINGE (ML) INJECTION EVERY 12 HOURS SCHEDULED
Status: DISCONTINUED | OUTPATIENT
Start: 2021-06-26 | End: 2021-06-28 | Stop reason: HOSPADM

## 2021-06-26 RX ORDER — GABAPENTIN 300 MG/1
300 CAPSULE ORAL 3 TIMES DAILY PRN
Status: DISCONTINUED | OUTPATIENT
Start: 2021-06-26 | End: 2021-06-27

## 2021-06-26 RX ORDER — OXYCODONE HYDROCHLORIDE AND ACETAMINOPHEN 5; 325 MG/1; MG/1
TABLET ORAL
COMMUNITY
Start: 2021-05-26

## 2021-06-26 RX ORDER — SODIUM CHLORIDE 0.9 % (FLUSH) 0.9 %
5-40 SYRINGE (ML) INJECTION PRN
Status: DISCONTINUED | OUTPATIENT
Start: 2021-06-26 | End: 2021-06-28 | Stop reason: HOSPADM

## 2021-06-26 RX ORDER — POLYETHYLENE GLYCOL 3350 17 G/17G
17 POWDER, FOR SOLUTION ORAL DAILY PRN
Status: DISCONTINUED | OUTPATIENT
Start: 2021-06-26 | End: 2021-06-28 | Stop reason: HOSPADM

## 2021-06-26 RX ORDER — GABAPENTIN 300 MG/1
300 CAPSULE ORAL 3 TIMES DAILY
Status: DISCONTINUED | OUTPATIENT
Start: 2021-06-26 | End: 2021-06-26

## 2021-06-26 RX ORDER — PANTOPRAZOLE SODIUM 40 MG/1
40 TABLET, DELAYED RELEASE ORAL
Status: DISCONTINUED | OUTPATIENT
Start: 2021-06-27 | End: 2021-06-28 | Stop reason: HOSPADM

## 2021-06-26 RX ORDER — ONDANSETRON 2 MG/ML
4 INJECTION INTRAMUSCULAR; INTRAVENOUS EVERY 6 HOURS PRN
Status: DISCONTINUED | OUTPATIENT
Start: 2021-06-26 | End: 2021-06-28 | Stop reason: HOSPADM

## 2021-06-26 RX ORDER — GABAPENTIN 300 MG/1
300 CAPSULE ORAL ONCE
Status: COMPLETED | OUTPATIENT
Start: 2021-06-26 | End: 2021-06-26

## 2021-06-26 RX ORDER — LINEZOLID 600 MG/1
600 TABLET, FILM COATED ORAL EVERY 12 HOURS
COMMUNITY
Start: 2021-06-24 | End: 2021-07-04

## 2021-06-26 RX ORDER — SODIUM CHLORIDE 9 MG/ML
INJECTION, SOLUTION INTRAVENOUS CONTINUOUS
Status: DISCONTINUED | OUTPATIENT
Start: 2021-06-26 | End: 2021-06-27

## 2021-06-26 RX ORDER — ACETAMINOPHEN 325 MG/1
650 TABLET ORAL EVERY 6 HOURS PRN
Status: DISCONTINUED | OUTPATIENT
Start: 2021-06-26 | End: 2021-06-28 | Stop reason: HOSPADM

## 2021-06-26 RX ORDER — ACETAMINOPHEN 650 MG/1
650 SUPPOSITORY RECTAL EVERY 6 HOURS PRN
Status: DISCONTINUED | OUTPATIENT
Start: 2021-06-26 | End: 2021-06-28 | Stop reason: HOSPADM

## 2021-06-26 RX ORDER — ASPIRIN 81 MG/1
81 TABLET ORAL DAILY
Status: DISCONTINUED | OUTPATIENT
Start: 2021-06-27 | End: 2021-06-28 | Stop reason: HOSPADM

## 2021-06-26 RX ORDER — POLYETHYLENE GLYCOL 3350 17 G/17G
17 POWDER, FOR SOLUTION ORAL DAILY
Status: DISCONTINUED | OUTPATIENT
Start: 2021-06-26 | End: 2021-06-28 | Stop reason: HOSPADM

## 2021-06-26 RX ORDER — METHOCARBAMOL 750 MG/1
750 TABLET, FILM COATED ORAL 4 TIMES DAILY PRN
Status: DISCONTINUED | OUTPATIENT
Start: 2021-06-26 | End: 2021-06-28 | Stop reason: HOSPADM

## 2021-06-26 RX ORDER — ONDANSETRON 4 MG/1
4 TABLET, ORALLY DISINTEGRATING ORAL EVERY 8 HOURS PRN
Status: DISCONTINUED | OUTPATIENT
Start: 2021-06-26 | End: 2021-06-28 | Stop reason: HOSPADM

## 2021-06-26 RX ORDER — SODIUM CHLORIDE 9 MG/ML
25 INJECTION, SOLUTION INTRAVENOUS PRN
Status: DISCONTINUED | OUTPATIENT
Start: 2021-06-26 | End: 2021-06-28 | Stop reason: HOSPADM

## 2021-06-26 RX ORDER — OXYCODONE HYDROCHLORIDE AND ACETAMINOPHEN 5; 325 MG/1; MG/1
1 TABLET ORAL EVERY 4 HOURS PRN
Status: DISCONTINUED | OUTPATIENT
Start: 2021-06-26 | End: 2021-06-28 | Stop reason: HOSPADM

## 2021-06-26 RX ORDER — OXYCODONE HYDROCHLORIDE AND ACETAMINOPHEN 5; 325 MG/1; MG/1
2 TABLET ORAL EVERY 4 HOURS PRN
Status: DISCONTINUED | OUTPATIENT
Start: 2021-06-26 | End: 2021-06-28 | Stop reason: HOSPADM

## 2021-06-26 RX ORDER — ATORVASTATIN CALCIUM 20 MG/1
20 TABLET, FILM COATED ORAL NIGHTLY
Status: DISCONTINUED | OUTPATIENT
Start: 2021-06-27 | End: 2021-06-28 | Stop reason: HOSPADM

## 2021-06-26 RX ADMIN — SODIUM CHLORIDE: 9 INJECTION, SOLUTION INTRAVENOUS at 15:45

## 2021-06-26 RX ADMIN — Medication 10 MG: at 23:55

## 2021-06-26 RX ADMIN — ENOXAPARIN SODIUM 40 MG: 40 INJECTION SUBCUTANEOUS at 15:45

## 2021-06-26 RX ADMIN — VANCOMYCIN HYDROCHLORIDE 1500 MG: 10 INJECTION, POWDER, LYOPHILIZED, FOR SOLUTION INTRAVENOUS at 12:55

## 2021-06-26 RX ADMIN — CEFEPIME HYDROCHLORIDE 2000 MG: 2 INJECTION, POWDER, FOR SOLUTION INTRAVENOUS at 15:45

## 2021-06-26 RX ADMIN — GABAPENTIN 300 MG: 300 CAPSULE ORAL at 14:35

## 2021-06-26 RX ADMIN — IOPAMIDOL 80 ML: 755 INJECTION, SOLUTION INTRAVENOUS at 11:31

## 2021-06-26 RX ADMIN — GABAPENTIN 300 MG: 300 CAPSULE ORAL at 23:55

## 2021-06-26 RX ADMIN — OXYCODONE AND ACETAMINOPHEN 1 TABLET: 5; 325 TABLET ORAL at 19:58

## 2021-06-26 RX ADMIN — ACETAMINOPHEN 650 MG: 325 TABLET ORAL at 15:45

## 2021-06-26 RX ADMIN — ATORVASTATIN CALCIUM 20 MG: 20 TABLET, FILM COATED ORAL at 23:56

## 2021-06-26 RX ADMIN — Medication 10 ML: at 19:58

## 2021-06-26 RX ADMIN — OXYCODONE AND ACETAMINOPHEN 1 TABLET: 5; 325 TABLET ORAL at 23:55

## 2021-06-26 ASSESSMENT — PAIN DESCRIPTION - PROGRESSION
CLINICAL_PROGRESSION: GRADUALLY WORSENING
CLINICAL_PROGRESSION: NOT CHANGED
CLINICAL_PROGRESSION: GRADUALLY WORSENING

## 2021-06-26 ASSESSMENT — PAIN DESCRIPTION - ORIENTATION
ORIENTATION: POSTERIOR
ORIENTATION: ANTERIOR

## 2021-06-26 ASSESSMENT — ENCOUNTER SYMPTOMS
NAUSEA: 0
SHORTNESS OF BREATH: 0
EYE PAIN: 0
BACK PAIN: 0
VOMITING: 0
DIARRHEA: 0
PHOTOPHOBIA: 0
ABDOMINAL PAIN: 0
COUGH: 0

## 2021-06-26 ASSESSMENT — PAIN DESCRIPTION - DESCRIPTORS
DESCRIPTORS: THROBBING
DESCRIPTORS: THROBBING;CONSTANT
DESCRIPTORS: TENDER
DESCRIPTORS: PRESSURE

## 2021-06-26 ASSESSMENT — PAIN - FUNCTIONAL ASSESSMENT
PAIN_FUNCTIONAL_ASSESSMENT: ACTIVITIES ARE NOT PREVENTED
PAIN_FUNCTIONAL_ASSESSMENT: ACTIVITIES ARE NOT PREVENTED

## 2021-06-26 ASSESSMENT — PAIN SCALES - GENERAL
PAINLEVEL_OUTOF10: 4
PAINLEVEL_OUTOF10: 0
PAINLEVEL_OUTOF10: 5
PAINLEVEL_OUTOF10: 5
PAINLEVEL_OUTOF10: 3

## 2021-06-26 ASSESSMENT — PAIN DESCRIPTION - FREQUENCY
FREQUENCY: CONTINUOUS

## 2021-06-26 ASSESSMENT — PAIN DESCRIPTION - ONSET
ONSET: ON-GOING

## 2021-06-26 ASSESSMENT — PAIN DESCRIPTION - LOCATION
LOCATION: BACK
LOCATION: ABDOMEN

## 2021-06-26 ASSESSMENT — PAIN DESCRIPTION - PAIN TYPE
TYPE: SURGICAL PAIN
TYPE: ACUTE PAIN
TYPE: ACUTE PAIN
TYPE: SURGICAL PAIN
TYPE: SURGICAL PAIN

## 2021-06-26 NOTE — H&P
Hospital Medicine History & Physical      PCP: Rik Archuleta    Date of Admission: 6/26/2021    Date of Service: Pt seen/examined on 06/26/21 and Admitted to Inpatient with expected LOS greater than two midnights due to medical therapy. Chief Complaint:  Swelling, erythema at surgical site, also had some drainage. History Of Present Illness:      Jose Singh is a 70 y.o. female with past medical history as below, who underwent lumbar surgery on 5/26/21, presents with worsening erythema around the umbilicus incision that she first noticed about 10 days ago. She was started on Bactrim for a couple days and then switched to Zyvox on Thursday. The area is tender to the touch. The erythema just hasn't improved and she continues to have small amounts of pus that drain. No fevers, nausea, vomiting, chills or other new symptoms. She has been well otherwise post-op and walking around every day. 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 L4-5 DECOMPRESSION FUSION FIXATION USING PARAMEDIAN TECHNIQUES performed by Angel Luis Vega MD at [address] [place] [address] Said  11/2020       Medications Prior to Admission:      Prior to Admission medications    Medication Sig Start Date End Date Taking?  Authorizing Provider   DOCUSATE SODIUM  mg 5/26/21  Yes Historical Provider, MD   linezolid (ZYVOX) 600 MG tablet Take 600 mg by mouth every 12 hours 6/24/21 7/4/21 Yes Historical Provider, MD   oxyCODONE-acetaminophen (PERCOCET) 5-325 MG per tablet 1-2 tablet: 1-2 TABS AS NEEDED FOR PAIN EVERY 4 HRS. 5/26/21  Yes Historical Provider, MD   sulfamethoxazole-trimethoprim (BACTRIM DS;SEPTRA DS) 800-160 MG per tablet Take 1 tablet by mouth 2 times daily   Yes Historical Provider, MD   aspirin 81 MG EC tablet Take 1 tablet by mouth daily 6/9/21  Yes SINDY Green NP   polyethylene glycol (GLYCOLAX) 17 g packet Take 17 g by mouth daily 5/29/21 6/28/21 Yes SINDY Green NP   Omega-3 Fatty Acids (FISH OIL) 1000 MG CAPS Take 1 capsule by mouth daily 6/9/21  Yes SINDY Green NP   ascorbic acid (VITAMIN C) 500 MG tablet Take 500 mg by mouth   Yes Historical Provider, MD   melatonin 10 MG CAPS capsule Take by mouth   Yes Historical Provider, MD   omeprazole (PRILOSEC) 20 MG delayed release capsule Take 20 mg by mouth 12/22/20  Yes Historical Provider, MD   simvastatin (ZOCOR) 40 MG tablet Take 40 mg by mouth 12/22/20  Yes Historical Provider, MD   Psyllium (METAMUCIL PO) Take by mouth   Yes Historical Provider, MD   Acetaminophen (TYLENOL PO) Take by mouth   Yes Historical Provider, MD   methocarbamol (ROBAXIN) 500 MG tablet Take 1-2 tablets by mouth every 6 hours as needed (spasm) 5/26/21   ANDRE Heath       Allergies:  Patient has no known allergies. Social History:      The patient currently lives in private residence. TOBACCO:   reports that she has never smoked. She has never used smokeless tobacco.  ETOH:   reports previous alcohol use. Family History:      Reviewed in detail and positive as follows:    History reviewed. No pertinent family history. REVIEW OF SYSTEMS:   Pertinent positives as noted in the HPI. All other systems reviewed and negative. PHYSICAL EXAM:    BP (!) 113/57   Pulse 86   Temp 97.2 °F (36.2 °C) (Oral)   Resp 16   Ht 5' 6\" (1.676 m)   Wt 209 lb (94.8 kg)   SpO2 95%   BMI 33.73 kg/m²     General appearance: No apparent distress, appears stated age and cooperative. HEENT: Normal cephalic, atraumatic without obvious deformity. Pupils equal, round, and reactive to light. Extra ocular muscles intact. Conjunctivae/corneas clear. Neck: Supple, with full range of motion. No jugular venous distention. Trachea midline. Respiratory:  Normal respiratory effort. Clear to auscultation, bilaterally without Rales/Wheezes/Rhonchi. Cardiovascular: Regular rate and rhythm with normal S1/S2 without murmurs, rubs or gallops. Abdomen: Soft, non-tender, non-distended with normal bowel sounds. Musculoskelatal: No clubbing, cyanosis or edema bilaterally. Full range of motion without deformity. Skin: Periumbilical erythema   Neurologic:  Neurovascularly intact without any focal sensory/motor deficits. Cranial nerves: II-XII intact, grossly non-focal.  Psychiatric: Alert and oriented, thought content appropriate, normal insight    Labs:     Recent Labs     06/26/21  0957   WBC 5.8   HGB 13.5   HCT 41.2        Recent Labs     06/26/21  0957      K 4.3      CO2 23   BUN 21*   CREATININE 0.9   CALCIUM 9.4     No results for input(s): AST, ALT, BILIDIR, BILITOT, ALKPHOS in the last 72 hours. No results for input(s): INR in the last 72 hours. No results for input(s): Towana Ball in the last 72 hours. Urinalysis:    No results found for: NITRU, WBCUA, BACTERIA, RBCUA, BLOODU, SPECGRAV, GLUCOSEU    Studies:  CT ABDOMEN PELVIS W IV CONTRAST Additional Contrast? None   Final Result      Prior laparotomy with associated inflammatory changes and cutaneous thickening but no focal fluid collection. New mild bilateral hydronephrosis. No radiopaque renal or ureteral calculi visualized.           ASSESSMENT:    Active Hospital Problems    Diagnosis Date Noted    Surgical site infection [T81.49XA] 06/26/2021   Cellulitis at incision site, not improving on Zyvox  Mild hydronephrosis, incidental finding on CT scan  HLD  GERD    PLAN:    Purulent cellulitis at surgical site with concern for failure on outpatient antibiotics, Bactrim followed by Zyvox, sounds like she was on about 5 days  Admit for IV antibiotics, may need incision and drainage. CT scan w/contrast read shows: cutaneous thickening but no focal fluid collection  Blood cultures ordered  She says she had MRSA swab at [facility] outpatient I do not see a result yet in care everywhere  I was only able to express a small amount of drainage I sent for wound culture  General surgery consulted for I&D  Pharmacy to dose vancomycin  Cefepime  Infectious disease consulted  Gentle IV fluids  Monitor Cr. She has not noted any decline in urine output. No renal calculi noted. DVT Prophylaxis: Lovenox  Diet: No diet orders on file  Code Status: Prior    PT/OT Eval Status: pending    [address] DO    Thank you Stella Dyer for the opportunity to be involved in this patient's care. If you have any questions or concerns please feel free to contact me at perfectserve.

## 2021-06-26 NOTE — PROGRESS NOTES
4 Eyes Admission Assessment     I agree as the admission nurse that 2 RN's have performed a thorough Head to Toe Skin Assessment on the patient. ALL assessment sites listed below have been assessed on admission. Areas assessed by both nurses:   [x]   Head, Face, and Ears   [x]   Shoulders, Back, and Chest  [x]   Arms, Elbows, and Hands   [x]   Coccyx, Sacrum, and Ischium  [x]   Legs, Feet, and Heels        Does the Patient have Skin Breakdown?   No         Scott Prevention initiated:  No   Wound Care Orders initiated:  No      [facility] nurse consulted for Pressure Injury (Stage 3,4, Unstageable, DTI, NWPT, and Complex wounds) or Scott score 18 or lower:  No      Nurse 1 eSignature: Electronically signed by Arsen Ibarra RN on 6/26/21 at 3:15 PM EDT    **SHARE this note so that the co-signing nurse is able to place an eSignature**    Nurse 2 eSignature: Electronically signed by Paolo Goldman RN on 6/26/21 at 3:29 PM EDT

## 2021-06-26 NOTE — CONSULTS
Clinical Pharmacy Consult Note    Admit date: 6/26/2021    Subjective/Objective:  70 yof with PMHx of MRSA colonization and spinal stenosis who underwent lumbar surgery on 5/26/21 presented with worsening erythema around the umbilicus incision that she first noticed ~10 days ago. Patient was started on Bactrim but was switched to Zyvox on 6/24. Patient reports lack of improvement on outpatient antibiotic regimen. Patient admitted with surgical site infection for IV antibiotics. Pharmacy is consulted to dose Vancomycin per Dr. Marley Maki    Pertinent Medications:  Cefepime 2g IV q12h -- day # 1  Vancomycin, pharmacy to dose -- day # 1   1.5g IV x1 (6/26)   1g IV q12h (to begin 6/27)       Recent Labs     06/26/21  0957      K 4.3      CO2 23   BUN 21*   CREATININE 0.9       Estimated Creatinine Clearance: 65 mL/min (based on SCr of 0.9 mg/dL). Recent Labs     06/26/21  0957   WBC 5.8   HGB 13.5   HCT 41.2   MCV 96.0          Height:  5' 6\" (167.6 cm)  Weight: 209 lb (94.8 kg)    Micro: Abscess (6/26): sent  Blood x2 (6/26): sent    Assessment/Plan:  1. Surgical site infection:  Cefepime, vancomycin  Vancomycin - pharmacy to dose  · Patient received bolus dose of 1.5g IV x1 in ED today. Will start 1g IV q12h  Provides ~ 10.5 mg/kg and is based on a half-life elimination of 12 hours. · Trough will be ordered when appropriate. Target ~15 mcg/mL. · Renal function will be monitored closely and dosing will be adjusted as appropriate. Please call with any questions.   Shawn Rodriguez, PharmD, BCPS  Main pharmacy: [id number]  6/26/2021 3:12 PM

## 2021-06-26 NOTE — ED PROVIDER NOTES
ED Attending Attestation Note     Date of evaluation: 6/26/2021    This patient was seen by the advance practice provider. I have seen and examined the patient, agree with the workup, evaluation, management and diagnosis. The care plan has been discussed. I have reviewed the ECG and concur with the JOHNNIE's interpretation. My assessment reveals adult female in no acute distress who presents with concern for infection around her surgical site. On examination there is area of erythema some slight induration around the superior portion of the umbilicus on her surgical site on the anterior abdomen. There is minimal abdominal tenderness otherwise.        Camille Reyes MD  06/26/21 1038

## 2021-06-26 NOTE — PROCEDURES
Jenny Lopez is a 70 y.o. female patient. 1. 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 rate 16, height 5' 6\" (1.676 m), weight 209 lb (94.8 kg), SpO2 97 %. Procedures - INCISION AND DRAINAGE OF ABSCESS    After informed consent was obtained, the abdominal wound was cleansed with betadine and draped in a sterile fashion. About 12 cc of lidocaine with epi was instilled for local anesthesia. An 11-blade scalpel was then used to create a cruciate incision at the area of greatest fluctuance/induration. No area of true purulence was encountered. A hemostat was then used to break up loculations and fibrinous tissue beneath this area, remaining within the subcutaneous tissues. An area of what appeared to be a drainage tract was incised from the corner of the right inferior quadrant of the cruciate incision. Loculations tracked to the right for about 1.5 cm and less extensively to the left of the incision. A culture swab was taken from the wound immediately after opening the wound. The area was copiously irrigated. Additional loculations were opened using the hemostat. Areas of fibrinous tissue were removed using scissors. The wound was again irrigated. 1 inch iodoform packing was then used to pack the wound, including the areas of tracking laterally. The wound was then covered with a gauze and tape. The patient tolerated the procedure well without any complications.   EBL ~ 5 cc    Shivam Pope DO  PGY2, General Surgery  06/26/21  11:49 PM  [phone]

## 2021-06-26 NOTE — ED PROVIDER NOTES
Musculoskeletal: Negative for back pain and neck pain. Skin:        Redness surrounding surgical site   Neurological: Negative for dizziness and headaches. Psychiatric/Behavioral: Negative for suicidal ideas. 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 She has a past surgical history that includes back surgery (01/22/2020); Carpal tunnel release; Hysterectomy; Nasal septum surgery (11/2020); and lumbar fusion (N/A, 5/26/2021). Her family history is not on file. She reports that she has never smoked. She has never used smokeless tobacco. She reports previous alcohol use. She reports that she does not use drugs. Medications     Current Discharge Medication List      CONTINUE these medications which have NOT CHANGED    Details   DOCUSATE SODIUM  mg      linezolid (ZYVOX) 600 MG tablet Take 600 mg by mouth every 12 hours      oxyCODONE-acetaminophen (PERCOCET) 5-325 MG per tablet 1-2 tablet: 1-2 TABS AS NEEDED FOR PAIN EVERY 4 HRS.       sulfamethoxazole-trimethoprim (BACTRIM DS;SEPTRA DS) 800-160 MG per tablet Take 1 tablet by mouth 2 times daily      aspirin 81 MG EC tablet Take 1 tablet by mouth daily  Qty: 30 tablet, Refills: 3      polyethylene glycol (GLYCOLAX) 17 g packet Take 17 g by mouth daily  Qty: 527 g, Refills: 1      Omega-3 Fatty Acids (FISH OIL) 1000 MG CAPS Take 1 capsule by mouth daily  Qty: 90 capsule, Refills: 3      ascorbic acid (VITAMIN C) 500 MG tablet Take 500 mg by mouth      melatonin 10 MG CAPS capsule Take by mouth      omeprazole (PRILOSEC) 20 MG delayed release capsule Take 20 mg by mouth      simvastatin (ZOCOR) 40 MG tablet Take 40 mg by mouth      Psyllium (METAMUCIL PO) Take by mouth      Acetaminophen (TYLENOL PO) Take by mouth      methocarbamol (ROBAXIN) 500 MG tablet Take 1-2 tablets by mouth every 6 hours as needed (spasm)  Qty: 80 tablet, Refills: 1             Allergies     She has No Known Allergies. Physical Exam     INITIAL VITALS: BP: (!) 163/70, Temp: 97.2 °F (36.2 °C), Pulse: 92, Resp: 20, SpO2: 100 %  Physical Exam  Constitutional:       General: She is not in acute distress. Appearance: She is well-developed. HENT:      Head: Normocephalic and atraumatic. Eyes:      Pupils: Pupils are equal, round, and reactive to light. Cardiovascular:      Rate and Rhythm: Normal rate and regular rhythm. Heart sounds: No murmur heard. No friction rub. No gallop. Pulmonary:      Effort: Pulmonary effort is normal. No respiratory distress. Breath sounds: Normal breath sounds. Abdominal:      Palpations: Abdomen is soft. Tenderness: There is no abdominal tenderness. Comments: 12 x 4 cm area of erythema superior to the umbilicus as pictured below. There is some purulent drainage from the incision site. Musculoskeletal:         General: Normal range of motion. Cervical back: Normal range of motion and neck supple. Skin:     General: Skin is warm and dry. Neurological:      Mental Status: She is alert and oriented to person, place, and time. Diagnostic Results     RADIOLOGY:  CT ABDOMEN PELVIS W IV CONTRAST Additional Contrast? None   Final Result      Prior laparotomy with associated inflammatory changes and cutaneous thickening but no focal fluid collection. New mild bilateral hydronephrosis. No radiopaque renal or ureteral calculi visualized.           LABS:   Results for orders placed or performed during the hospital encounter of 06/26/21   CBC Auto Differential   Result Value Ref Range    WBC 5.8 4.0 - 11.0 K/uL    RBC 4.29 4.00 - 5.20 M/uL    Hemoglobin 13.5 12.0 - 16.0 g/dL    Hematocrit 41.2 36.0 - 48.0 %    MCV 96.0 80.0 - 100.0 fL    MCH 31.4 26.0 - 34.0 pg    MCHC 32.8 31.0 - 36.0 g/dL    RDW 14.7 12.4 - 15.4 % Platelets 281 527 - 667 K/uL    MPV 8.4 5.0 - 10.5 fL    Neutrophils % 69.5 %    Lymphocytes % 20.2 %    Monocytes % 7.2 %    Eosinophils % 2.5 %    Basophils % 0.6 %    Neutrophils Absolute 4.0 1.7 - 7.7 K/uL    Lymphocytes Absolute 1.2 1.0 - 5.1 K/uL    Monocytes Absolute 0.4 0.0 - 1.3 K/uL    Eosinophils Absolute 0.1 0.0 - 0.6 K/uL    Basophils Absolute 0.0 0.0 - 0.2 K/uL   Basic Metabolic Panel w/ Reflex to MG   Result Value Ref Range    Sodium 136 136 - 145 mmol/L    Potassium reflex Magnesium 4.3 3.5 - 5.1 mmol/L    Chloride 100 99 - 110 mmol/L    CO2 23 21 - 32 mmol/L    Anion Gap 13 3 - 16    Glucose 121 (H) 70 - 99 mg/dL    BUN 21 (H) 7 - 20 mg/dL    CREATININE 0.9 0.6 - 1.2 mg/dL    GFR Non-African American >60 >60    GFR African American >60 >60    Calcium 9.4 8.3 - 10.6 mg/dL   Lactic Acid, Plasma   Result Value Ref Range    Lactic Acid 2.2 (H) 0.4 - 2.0 mmol/L       RECENT VITALS:  BP: 120/60, Temp: 97.2 °F (36.2 °C), Pulse: 82, Resp: 20, SpO2: 99 %     ED Course     Nursing Notes, Past Medical Hx,Past Surgical Hx, Social Hx, Allergies, and Family Hx were reviewed.     The patient was given the following medications:  Orders Placed This Encounter   Medications    iopamidol (ISOVUE-370) 76 % injection 80 mL    vancomycin (VANCOCIN) 1,500 mg in dextrose 5 % 250 mL IVPB     Order Specific Question:   Antimicrobial Indications     Answer:   Skin and Soft Tissue Infection    gabapentin (NEURONTIN) capsule 300 mg    sodium chloride flush 0.9 % injection 5-40 mL    sodium chloride flush 0.9 % injection 5-40 mL    0.9 % sodium chloride infusion    enoxaparin (LOVENOX) injection 40 mg    OR Linked Order Group     ondansetron (ZOFRAN-ODT) disintegrating tablet 4 mg     ondansetron (ZOFRAN) injection 4 mg    polyethylene glycol (GLYCOLAX) packet 17 g    OR Linked Order Group     acetaminophen (TYLENOL) tablet 650 mg     acetaminophen (TYLENOL) suppository 650 mg    cefepime (MAXIPIME) 2000 mg IVPB minibag     Order Specific Question:   Antimicrobial Indications     Answer:   Surgical Site Infection    0.9 % sodium chloride infusion    aspirin EC tablet 81 mg    melatonin disintegrating tablet 10 mg    pantoprazole (PROTONIX) tablet 40 mg    OR Linked Order Group     oxyCODONE-acetaminophen (PERCOCET) 5-325 MG per tablet 1 tablet     oxyCODONE-acetaminophen (PERCOCET) 5-325 MG per tablet 2 tablet    polyethylene glycol (GLYCOLAX) packet 17 g    methocarbamol (ROBAXIN) tablet 750 mg    vancomycin (VANCOCIN) 1,000 mg in dextrose 5 % 250 mL IVPB     Order Specific Question:   Antimicrobial Indications     Answer:   Surgical Site Infection       CONSULTS:  IP CONSULT TO NEUROSURGERY  IP CONSULT TO HOSPITALIST  PHARMACY TO DOSE VANCOMYCIN  IP CONSULT TO GENERAL SURGERY  IP CONSULT TO INFECTIOUS DISEASES    MEDICAL DECISION MAKING / ASSESSMENT / Jasmina Prashant is a 70 y.o. female who presents the emergency department due to concern for infected surgical site. Vital signs stable on presentation remained stable throughout her stay. Thorough history and physical exam performed in detail above. Patient presents the emergency department due to concern for infected surgical site superior to the umbilicus. She had operative intervention for her back pain at this hospital on 5/26/2021. She developed erythema superior to her umbilicus on 0/41/4228. She was started on Bactrim on 6/22/2021 and then switched to linezolid on 6/24/2021. She states the erythema has continued to spread. Rates her pain is a 2/10 at rest, however this increases with any touch. Denies other abdominal pains or any nausea, vomiting, or fevers. On physical exam patient does have a large area of erythema superior to the umbilicus as pictured above with some purulent discharge. No other tenderness palpation throughout the abdomen. CBC without leukocytosis. BMP unremarkable. Lactate elevated to 2.2.   CT abdomen pelvis was obtained to evaluate for possible underlying abscess. Showed no signs of focal fluid collection. At this time, I do believe patient warrants admission to the hospital for IV antibiotics given failure of outpatient medications. This was discussed with neurosurgery who is agreeable. Call was placed to hospitalist to discuss admission. This patient was also evaluated by the attending physician. All care plans were discussed and agreed upon. Clinical Impression     1. Cellulitis of abdominal wall        Disposition     PATIENT REFERRED TO:  No follow-up provider specified.     DISCHARGE MEDICATIONS:  Current Discharge Medication List          DISPOSITION  admit        Eduar Snowden, [place]  06/26/21 2900

## 2021-06-26 NOTE — ED TRIAGE NOTES
Had back surgery at this facility on 5/26. States everything went well but she started having redness and drainage on 6/17. Had swab done for MRSA on Thursday but no results yet.  Surgeon told pt to come to ED for evaluation d/t increasing signs of infection

## 2021-06-27 LAB
ANION GAP SERPL CALCULATED.3IONS-SCNC: 10 MMOL/L (ref 3–16)
BASOPHILS ABSOLUTE: 0 K/UL (ref 0–0.2)
BASOPHILS RELATIVE PERCENT: 0.6 %
BUN BLDV-MCNC: 17 MG/DL (ref 7–20)
CALCIUM SERPL-MCNC: 9.1 MG/DL (ref 8.3–10.6)
CHLORIDE BLD-SCNC: 104 MMOL/L (ref 99–110)
CO2: 26 MMOL/L (ref 21–32)
CREAT SERPL-MCNC: 0.8 MG/DL (ref 0.6–1.2)
EOSINOPHILS ABSOLUTE: 0.2 K/UL (ref 0–0.6)
EOSINOPHILS RELATIVE PERCENT: 5.1 %
GFR AFRICAN AMERICAN: >60
GFR NON-AFRICAN AMERICAN: >60
GLUCOSE BLD-MCNC: 100 MG/DL (ref 70–99)
HCT VFR BLD CALC: 36.9 % (ref 36–48)
HEMOGLOBIN: 12.4 G/DL (ref 12–16)
LYMPHOCYTES ABSOLUTE: 1.2 K/UL (ref 1–5.1)
LYMPHOCYTES RELATIVE PERCENT: 26.7 %
MCH RBC QN AUTO: 32.4 PG (ref 26–34)
MCHC RBC AUTO-ENTMCNC: 33.7 G/DL (ref 31–36)
MCV RBC AUTO: 96.3 FL (ref 80–100)
MONOCYTES ABSOLUTE: 0.4 K/UL (ref 0–1.3)
MONOCYTES RELATIVE PERCENT: 9.6 %
NEUTROPHILS ABSOLUTE: 2.6 K/UL (ref 1.7–7.7)
NEUTROPHILS RELATIVE PERCENT: 58 %
PDW BLD-RTO: 14.8 % (ref 12.4–15.4)
PLATELET # BLD: 210 K/UL (ref 135–450)
PMV BLD AUTO: 8 FL (ref 5–10.5)
POTASSIUM REFLEX MAGNESIUM: 4.7 MMOL/L (ref 3.5–5.1)
RBC # BLD: 3.83 M/UL (ref 4–5.2)
SODIUM BLD-SCNC: 140 MMOL/L (ref 136–145)
WBC # BLD: 4.5 K/UL (ref 4–11)

## 2021-06-27 PROCEDURE — 36415 COLL VENOUS BLD VENIPUNCTURE: CPT

## 2021-06-27 PROCEDURE — 1200000000 HC SEMI PRIVATE

## 2021-06-27 PROCEDURE — 85025 COMPLETE CBC W/AUTO DIFF WBC: CPT

## 2021-06-27 PROCEDURE — 6370000000 HC RX 637 (ALT 250 FOR IP): Performed by: INTERNAL MEDICINE

## 2021-06-27 PROCEDURE — 99221 1ST HOSP IP/OBS SF/LOW 40: CPT | Performed by: INTERNAL MEDICINE

## 2021-06-27 PROCEDURE — 6360000002 HC RX W HCPCS: Performed by: INTERNAL MEDICINE

## 2021-06-27 PROCEDURE — 2580000003 HC RX 258: Performed by: INTERNAL MEDICINE

## 2021-06-27 PROCEDURE — 80048 BASIC METABOLIC PNL TOTAL CA: CPT

## 2021-06-27 RX ORDER — DOCUSATE SODIUM 100 MG/1
100 CAPSULE, LIQUID FILLED ORAL DAILY
Status: DISCONTINUED | OUTPATIENT
Start: 2021-06-27 | End: 2021-06-28 | Stop reason: HOSPADM

## 2021-06-27 RX ORDER — GABAPENTIN 300 MG/1
300 CAPSULE ORAL 3 TIMES DAILY
Status: DISCONTINUED | OUTPATIENT
Start: 2021-06-27 | End: 2021-06-28 | Stop reason: HOSPADM

## 2021-06-27 RX ADMIN — MUPIROCIN: 20 OINTMENT TOPICAL at 13:51

## 2021-06-27 RX ADMIN — CEFEPIME HYDROCHLORIDE 2000 MG: 2 INJECTION, POWDER, FOR SOLUTION INTRAVENOUS at 04:03

## 2021-06-27 RX ADMIN — ASPIRIN 81 MG: 81 TABLET, COATED ORAL at 08:41

## 2021-06-27 RX ADMIN — OXYCODONE AND ACETAMINOPHEN 1 TABLET: 5; 325 TABLET ORAL at 08:41

## 2021-06-27 RX ADMIN — POLYETHYLENE GLYCOL 3350 17 G: 17 POWDER, FOR SOLUTION ORAL at 08:41

## 2021-06-27 RX ADMIN — VANCOMYCIN HYDROCHLORIDE 1000 MG: 10 INJECTION, POWDER, LYOPHILIZED, FOR SOLUTION INTRAVENOUS at 01:16

## 2021-06-27 RX ADMIN — GABAPENTIN 300 MG: 300 CAPSULE ORAL at 21:10

## 2021-06-27 RX ADMIN — GABAPENTIN 300 MG: 300 CAPSULE ORAL at 08:41

## 2021-06-27 RX ADMIN — DOCUSATE SODIUM 100 MG: 100 CAPSULE, LIQUID FILLED ORAL at 13:50

## 2021-06-27 RX ADMIN — Medication 10 ML: at 08:41

## 2021-06-27 RX ADMIN — ATORVASTATIN CALCIUM 20 MG: 20 TABLET, FILM COATED ORAL at 21:10

## 2021-06-27 RX ADMIN — Medication 10 MG: at 21:10

## 2021-06-27 RX ADMIN — OXYCODONE AND ACETAMINOPHEN 1 TABLET: 5; 325 TABLET ORAL at 21:10

## 2021-06-27 RX ADMIN — OXYCODONE AND ACETAMINOPHEN 1 TABLET: 5; 325 TABLET ORAL at 17:01

## 2021-06-27 RX ADMIN — PANTOPRAZOLE SODIUM 40 MG: 40 TABLET, DELAYED RELEASE ORAL at 06:32

## 2021-06-27 RX ADMIN — ENOXAPARIN SODIUM 40 MG: 40 INJECTION SUBCUTANEOUS at 08:41

## 2021-06-27 RX ADMIN — GABAPENTIN 300 MG: 300 CAPSULE ORAL at 16:04

## 2021-06-27 RX ADMIN — VANCOMYCIN HYDROCHLORIDE 1000 MG: 10 INJECTION, POWDER, LYOPHILIZED, FOR SOLUTION INTRAVENOUS at 12:24

## 2021-06-27 RX ADMIN — Medication 10 ML: at 21:10

## 2021-06-27 RX ADMIN — MUPIROCIN: 20 OINTMENT TOPICAL at 21:09

## 2021-06-27 ASSESSMENT — PAIN DESCRIPTION - ORIENTATION
ORIENTATION: ANTERIOR

## 2021-06-27 ASSESSMENT — PAIN DESCRIPTION - FREQUENCY
FREQUENCY: CONTINUOUS

## 2021-06-27 ASSESSMENT — PAIN SCALES - GENERAL
PAINLEVEL_OUTOF10: 0
PAINLEVEL_OUTOF10: 4
PAINLEVEL_OUTOF10: 0
PAINLEVEL_OUTOF10: 0
PAINLEVEL_OUTOF10: 2
PAINLEVEL_OUTOF10: 4
PAINLEVEL_OUTOF10: 4
PAINLEVEL_OUTOF10: 2
PAINLEVEL_OUTOF10: 4
PAINLEVEL_OUTOF10: 0

## 2021-06-27 ASSESSMENT — PAIN DESCRIPTION - LOCATION
LOCATION: ABDOMEN

## 2021-06-27 ASSESSMENT — PAIN DESCRIPTION - PROGRESSION
CLINICAL_PROGRESSION: NOT CHANGED
CLINICAL_PROGRESSION: GRADUALLY WORSENING
CLINICAL_PROGRESSION: GRADUALLY WORSENING

## 2021-06-27 ASSESSMENT — PAIN - FUNCTIONAL ASSESSMENT
PAIN_FUNCTIONAL_ASSESSMENT: ACTIVITIES ARE NOT PREVENTED

## 2021-06-27 ASSESSMENT — PAIN DESCRIPTION - ONSET
ONSET: ON-GOING

## 2021-06-27 ASSESSMENT — PAIN DESCRIPTION - PAIN TYPE
TYPE: SURGICAL PAIN

## 2021-06-27 ASSESSMENT — PAIN DESCRIPTION - DESCRIPTORS
DESCRIPTORS: CONSTANT;ACHING
DESCRIPTORS: ACHING;DISCOMFORT
DESCRIPTORS: CONSTANT;ACHING

## 2021-06-27 NOTE — PLAN OF CARE
Problem: Pain:  Goal: Pain level will decrease  Description: Pain level will decrease  Outcome: Ongoing  Note: Patient c/o soreness rated 5/10 to abdomen. PRN medication administered and ice applied to site. Upon reassessment patient verbalizing satisfaction. Problem: Infection:  Goal: Will remain free from infection  Description: Will remain free from infection  Outcome: Ongoing  Note: Unable to assess surgical site to abdomen due to dressing, dressing is clean/dry/intact. No odor or drainage noted. Patient remains afebrile at this time. Surgical site to lower back clean/dry/intact, incision well approximated. No odor/redness/drainage noted. Problem: Falls - Risk of:  Goal: Will remain free from falls  Description: Will remain free from falls  Outcome: Ongoing  Note: Hourly rounding on patient for needs. Non-skid socks on, bed in lowest position and locked. Bedside table, personal belongs, and nurse call light within reach. Instructed patient to use call light for assistance. Bed alarm on. Floor clear of clutter. Patient remains free of falls at this time.

## 2021-06-27 NOTE — PROGRESS NOTES
appearance: No apparent distress, appears stated age and cooperative. HEENT: Pupils equal, round, and reactive to light. Conjunctivae/corneas clear. Neck: Supple, with full range of motion. No jugular venous distention. Trachea midline. Respiratory:  Normal respiratory effort. Clear to auscultation, bilaterally without Rales/Wheezes/Rhonchi. Cardiovascular: Regular rate and rhythm with normal S1/S2 without murmurs, rubs or gallops. Abdomen: Soft, non-tender, non-distended with normal bowel sounds. Musculoskelatal: No clubbing, cyanosis or edema bilaterally. Skin: Dressing in place however area of erythema appears reduced  Neurologic:  Cranial nerves: II-XII intact, grossly non-focal.  Psychiatric: Alert and oriented, thought content appropriate, normal insight    Labs:   Recent Labs     06/26/21  0957 06/27/21  0609   WBC 5.8 4.5   HGB 13.5 12.4   HCT 41.2 36.9    210     Recent Labs     06/26/21  0957 06/27/21  0609    140   K 4.3 4.7    104   CO2 23 26   BUN 21* 17   CREATININE 0.9 0.8   CALCIUM 9.4 9.1     No results for input(s): AST, ALT, BILIDIR, BILITOT, ALKPHOS in the last 72 hours. No results for input(s): INR in the last 72 hours. No results for input(s): [place] Dec in the last 72 hours. Studies:  CT ABDOMEN PELVIS W IV CONTRAST Additional Contrast? None   Final Result      Prior laparotomy with associated inflammatory changes and cutaneous thickening but no focal fluid collection. New mild bilateral hydronephrosis. No radiopaque renal or ureteral calculi visualized.           Assessment/Plan:    Active Hospital Problems    Diagnosis Date Noted    Surgical site infection [T81.49XA] 06/26/2021     Cellulitis at incision site, not improving on Zyvox outpatient  Mild hydronephrosis, incidental finding on CT scan  HLD  GERD     PLAN:     Purulent cellulitis at surgical site with concern for failure on outpatient antibiotics, Bactrim followed by Zyvox, sounds like she was on about 5 days  Admit for IV antibiotics, improving after incision and drainage. CT scan w/contrast read shows: cutaneous thickening but no focal fluid collection  Blood cultures prelim negative  Patient tells me the outpatient culture was positive for MRSA. Wound culture pending  General surgery consulted for I&D  vancomycin  Cefepime stopped  Infectious disease following  Monitor Cr. She has not noted any decline in urine output. No renal calculi noted.      DVT Prophylaxis: Lovenox  Diet: ADULT DIET;  Regular  Code Status: Full Code    PT/OT Eval Status: pending    Dispo - Inpatient    Jesica Rosado DO

## 2021-06-27 NOTE — CONSULTS
Infectious Diseases   Consult Note      Reason for Consult:  SSI  Requesting Physician:  Charu Liriano DO       Date of Admission: 6/26/2021  Subjective:   CHIEF COMPLAINT:  None given       HPI:    Koki Solo is a 67yoF with history of lumbar spondylolisthesis                Recent admission 5/26-5/29/21 for L3-4, L4-5 ANTERIOR LUMBAR INTERBODY FUSION WITH POSTERIOR LUMBAR L3-4. L4-5 DECOMPRESSION FUSION FIXATION USING PARAMEDIAN TECHNIQUES      On or around 6/17/21 - erythema, wound drainage periumbilical.  Started on Bactrim 6/22/21, then linezolid 6/24/21  Wound culture collected 6/25/21 is positive for MRSA S levo, clinda, tetracycline, Bactrim, vanc BOO 1  Referred to ED 6/26/21 for evaluation due to lack of improvement  She denies fever, chills. WBC was wnl  CT no fluid collection     Bedside I&D 6/26/21      Current abx:  Cefepime 2g q12  vanc 1g q12        Past Surgical History:       Diagnosis Date    GERD (gastroesophageal reflux disease)     Hyperlipidemia     MRSA (methicillin resistant Staphylococcus aureus) colonization 05/26/2021    Nares    Spinal stenosis          Procedure Laterality Date    BACK SURGERY  01/22/2020    CARPAL TUNNEL RELEASE      HYSTERECTOMY      LUMBAR FUSION N/A 5/26/2021    L3-4, L4-5 ANTERIOR LUMBAR INTERBODY FUSION WITH POSTERIOR LUMBAR L3-4. L4-5 DECOMPRESSION FUSION FIXATION USING PARAMEDIAN TECHNIQUES performed by Mila Driscoll MD at [address] [place] [address] Said  11/2020       Social History:    TOBACCO:   reports that she has never smoked. She has never used smokeless tobacco.  ETOH:   reports previous alcohol use. There is no history of illicit drug use or other significant epidemiologic exposures. Family History:   History reviewed. No pertinent family history. There is no family history of autoimmune diseases or significant infectious diseases.       Current Medications:    Current Facility-Administered Medications: gabapentin (NEURONTIN) capsule 300 mg, 300 mg, Oral, TID  sodium chloride flush 0.9 % injection 5-40 mL, 5-40 mL, Intravenous, 2 times per day  sodium chloride flush 0.9 % injection 5-40 mL, 5-40 mL, Intravenous, PRN  0.9 % sodium chloride infusion, 25 mL, Intravenous, PRN  enoxaparin (LOVENOX) injection 40 mg, 40 mg, Subcutaneous, Daily  ondansetron (ZOFRAN-ODT) disintegrating tablet 4 mg, 4 mg, Oral, Q8H PRN **OR** ondansetron (ZOFRAN) injection 4 mg, 4 mg, Intravenous, Q6H PRN  polyethylene glycol (GLYCOLAX) packet 17 g, 17 g, Oral, Daily PRN  acetaminophen (TYLENOL) tablet 650 mg, 650 mg, Oral, Q6H PRN **OR** acetaminophen (TYLENOL) suppository 650 mg, 650 mg, Rectal, Q6H PRN  cefepime (MAXIPIME) 2000 mg IVPB minibag, 2,000 mg, Intravenous, Q12H  aspirin EC tablet 81 mg, 81 mg, Oral, Daily  melatonin disintegrating tablet 10 mg, 10 mg, Oral, Nightly PRN  pantoprazole (PROTONIX) tablet 40 mg, 40 mg, Oral, QAM AC  oxyCODONE-acetaminophen (PERCOCET) 5-325 MG per tablet 1 tablet, 1 tablet, Oral, Q4H PRN **OR** oxyCODONE-acetaminophen (PERCOCET) 5-325 MG per tablet 2 tablet, 2 tablet, Oral, Q4H PRN  polyethylene glycol (GLYCOLAX) packet 17 g, 17 g, Oral, Daily  methocarbamol (ROBAXIN) tablet 750 mg, 750 mg, Oral, 4x Daily PRN  vancomycin (VANCOCIN) 1,000 mg in dextrose 5 % 250 mL IVPB, 1,000 mg, Intravenous, Q12H  lidocaine-EPINEPHrine 1 %-1:624918 injection 20 mL, 20 mL, Intradermal, Once  silver nitrate applicators applicator 1 each, 1 each, Topical, Once  atorvastatin (LIPITOR) tablet 20 mg, 20 mg, Oral, Nightly      No Known Allergies       REVIEW OF SYSTEMS:    CONSTITUTIONAL:   No fever   EYES:  negative for blurred vision, eye discharge, visual disturbance and icterus  HEENT:  negative for acute hearing loss, tinnitus, ear drainage, sinus pressure, nasal congestion, epistaxis and snoring  RESPIRATORY:  No cough, shortness of breath, hemoptysis  CARDIOVASCULAR:  negative for chest pain, palpitations, exertional chest pressure/discomfort, edema, syncope  GASTROINTESTINAL:  negative for nausea, vomiting, diarrhea, constipation, blood in stool and   GENITOURINARY:  negative for frequency, dysuria, urinary incontinence, decreased urine volume, and hematuria  HEMATOLOGIC/LYMPHATIC:  negative for easy bruising, bleeding and lymphadenopathy  ALLERGIC/IMMUNOLOGIC:  negative for recurrent infections, angioedema, anaphylaxis and drug reactions  ENDOCRINE:  negative for weight changes and diabetic symptoms including polyuria, polydipsia and polyphagia  MUSCULOSKELETAL:  negative for acute joint swelling, decreased range of motion and muscle weakness  NEUROLOGICAL:  negative for headaches, slurred speech, unilateral weakness  PSYCHIATRIC/BEHAVIORAL: negative for hallucinations, behavioral problems, confusion and agitation. Objective:   PHYSICAL EXAM:      VITALS:  BP (!) 146/79 Comment: after ambulation  Pulse 75   Temp 97.9 °F (36.6 °C) (Oral)   Resp 17   Ht 5' 6\" (1.676 m)   Wt 209 lb (94.8 kg)   SpO2 98%   BMI 33.73 kg/m²      24HR INTAKE/OUTPUT:      Intake/Output Summary (Last 24 hours) at 6/27/2021 1204  Last data filed at 6/27/2021 0855  Gross per 24 hour   Intake 1806.49 ml   Output --   Net 1806.49 ml     CONSTITUTIONAL:  Awake, alert, cooperative, no apparent distress, and appears stated age  [de-identified]: NCAT, PERRL, EOMI. Sclera white, conjunctiva full. OP with moist mucosal membranes, no thrush, tongue protrudes midline  NECK:  Supple, symmetrical  LUNGS:  no increased work of breathing   ABDOMEN:  normal bowel sounds, soft  Packed wound cranial to umbilicus, +calor, erythema, tender. No active drainage or fluctuance   PSYCHIATRIC: Oriented to person place and time. No obvious depression or anxiety. MUSCULOSKELETAL: No obvious misalignment or effusion of the joints. No clubbing, cyanosis of the digits. SKIN:  normal skin color, texture, turgor and no redness, warmth, or swelling.  No palpable nodules or stigmata of embolic phenomenon  NEUROLOGIC: nonfocal exam  ACCESS:   IV site ok       DATA:    Old records have been reviewed    CBC:  Recent Labs     06/26/21  0957 06/27/21  0609   WBC 5.8 4.5   RBC 4.29 3.83*   HGB 13.5 12.4   HCT 41.2 36.9    210   MCV 96.0 96.3   MCH 31.4 32.4   MCHC 32.8 33.7   RDW 14.7 14.8      BMP:  Recent Labs     06/26/21  0957 06/27/21  0609    140   K 4.3 4.7    104   CO2 23 26   BUN 21* 17   CREATININE 0.9 0.8   CALCIUM 9.4 9.1   GLUCOSE 121* 100*        Cultures:   5/26 MRSA nasal screen +  6/25 Wound culture +MRSA   6/26 BC x2 NGTD   Wound culture pending, GS no organisms    Wound culture ordered       Radiology Review:  All pertinent images / reports were reviewed as a part of this visit. CT abd 6/26/21  Impression       Prior laparotomy with associated inflammatory changes and cutaneous thickening but no focal fluid collection.       New mild bilateral hydronephrosis. No radiopaque renal or ureteral calculi visualized.        Assessment:     Patient Active Problem List   Diagnosis    Lumbar stenosis with neurogenic claudication    S/P lumbar fusion    Surgical site infection       Tracy Tillman is a 67yoF who is evaluated for the following:    S/p anterior/posterior approach lumbar fusion with interbody cage 5/26/21    Admitted 8/08/23 with periumbilical surgical site infection that failed to improve with Bactrim, linezolid prior to admission   MRSA isolated in wound culture prior to admission   S/p bedside I&D 6/26/21  She is without signs of systemic illness, neurologically intact   Posterior incisions fully healed     MRSA nasal colonization     [place] mild hydro incidentally noted on CT, per primary     NKDA       Recs:  Continue IV vanc, pharmacy is dosing   DC cefepime   Nasal bactroban     Failed appropriate po abx prior to admission, likely bc drainage was necessary  Hopefully will be able to change back to po abx at DC as she improves barring any complications     D/w patient, questions addressed  D/w RICARDO Huff M.D. Thank you for the opportunity to participate in the care of your patient.     Please do not hesitate to contact me:   [phone] office

## 2021-06-27 NOTE — PLAN OF CARE
Problem: Pain:  Goal: Pain level will decrease  Description: Pain level will decrease  6/27/2021 0855 by Cassius Cruz RN  Outcome: Ongoing   All fall precautions in place. Bed locked and in lowest position with alarm on. Overbed table and personal belonings within reach. Call light within reach and patient instructed to use call light for assistance. Non-skid socks on. Problem: Infection:  Goal: Will remain free from infection  Description: Will remain free from infection  6/27/2021 0855 by Cassius Cruz RN  Outcome: Ongoing   Patient being treated with IV antibiotics with wound cultures pending. Patient afebrile. Skin assessed this shift. No new signs of impaired skin integrity. Skin is clean, dry, and intact.

## 2021-06-27 NOTE — PROGRESS NOTES
General Surgery   Daily Progress Note  Patient: Beto Matt    Attending: Dr. Anson Greco    CC: Post-operative wound infection    SUBJECTIVE:  No acute events. Denies nausea and vomiting. Tolerating diet. Having some pain to abdominal incision. ROS: A 14 point review of systems was conducted. Significant findings in HPI and assessment and plan below. OBJECTIVE:   Infusions:   sodium chloride        I/O:I/O last 3 completed shifts: In: 1090.2 [P.O.:240; I.V.:600.2; IV Piggyback:250]  Out: -            Wt Readings from Last 1 Encounters:   06/26/21 209 lb (94.8 kg)       Exam:  /71   Pulse 64   Temp 97.8 °F (36.6 °C) (Oral)   Resp 16   Ht 5' 6\" (1.676 m)   Wt 209 lb (94.8 kg)   SpO2 95%   BMI 33.73 kg/m²      General appearance: alert, sitting up in bed, in NAD  Eyes: EOMI, no scleral icterus  Neck: trachea midline, no JVD  Chest/Lungs: normal effort, no adventitious breath sounds, on RA  Cardiovascular: RRR  Abdomen: obese, soft, midline laparotomy wound well healed at majority of scar with area at most superior portion with cruciate incision with surrounding erythema and serosanguinous drainage. Wound probed without any purulence noted. Flushed and repacked with Iodoform packing. Skin: warm and dry, no rashes  Extremities: no edema, no cyanosis  Neuro: A&Ox3, no focal deficits, sensation intact              LABS:   Recent Labs     06/26/21  0957   WBC 5.8   HGB 13.5   HCT 41.2   MCV 96.0           Recent Labs     06/26/21  0957      K 4.3      CO2 23   BUN 21*   CREATININE 0.9      No results for input(s): AST, ALT, ALB, BILIDIR, BILITOT, ALKPHOS in the last 72 hours. No results for input(s): LIPASE, AMYLASE in the last 72 hours. No results for input(s): PROT, INR, APTT in the last 72 hours. No results for input(s): CKTOTAL, CKMB, CKMBINDEX, TROPONINI in the last 72 hours.     ASSESSMENT/PLAN:   This is a 70 y.o. female with Hx of recent lumbar surgery via anterior approach who presents with post-operative incisional wound infection.     - Incision and drainage yesterday with packing placed. - Follow-up wound cultures. - Continue packing changes with 1 inch iodoform BID.   - IV antibiotics and continued medical management per primary team   - [facility] and  for dressing changes. Tanya Mackenzie MD  General Surgery, PGY1  06/27/21  6:52 AM    I performed a history and physical examination of the patient and discussed management with the resident. I reviewed the resident's note and agree with the documented findings and plan of care.     Arsenio Ruiz M.D.  6/27/21   2:01 PM

## 2021-06-27 NOTE — CONSULTS
Neurosurgery  Consult  Note    2021 10:42 AM                               Qian Kehr                      LOS: 1 day               Subjective:     Pt underwent L3-L4, L4-L5 ALIF with Dr. Darío Santos  On . Pt developed redness and drainage from the proximal portion of the anterior incision on 2021. Pt was placed on bactrim and then switched to Zyvox on 2021. Redness and drainage worsened despite abx. Pt presented to the ER with purulent drainage from the proximal portion of the anterior wound. Pt denies back pain or LE pain . Pt denies fever, chills or sweats. Incision was lanced and packed by general surgery at the beside yesterday. Physical Exam:    Temp (24hrs), Av °F (36.7 °C), Min:97.8 °F (36.6 °C), Max:98.1 °F (36.7 °C)      Neuro Exam  The patient is well-appearing and is in no acute distress. Alert and oriented ×4, appropriate, conversant with normal mood and affect. Proximal portion of the wound is red, packing in place, dressing in place     RUE: Deltoids: 5/5, Triceps: 5/5, Biceps: 5/5, WE/WF: 5/5, Finger abd: 5/5, : 5/5   LUE: Deltoids: 5/5, Triceps: 5/5, Biceps: 5/5, WE/WF: 5/5, Finger abd: 5/5, : 5/5  LLE: HE: 5/5, HF: 5/5, KE:5/5, KF: 5/5, PF: 5/5, DF: 5/5  RLE: HE: 5/5, HF: 5/5, KE:5/5, KF: 5/5, PF: 5/5, DF: 5/5    Labs:  Recent Labs     21  0609   WBC 4.5   HGB 12.4   HCT 36.9          Recent Labs     21  0609      K 4.7      CO2 26   BUN 17   CREATININE 0.8   GLUCOSE 100*   CALCIUM 9.1       No results for input(s): PROTIME, INR, APTT in the last 72 hours. Radiology: CT scan reviewed, which reveals subcutaneous inflammation and stranding at area of concern at the superior aspect of the incision - no discernable abscess cavity or fluid collection    Assessment and Plan:  Pt is a 70y.o. year old female with L3-L4, L4-L5 ALIF with Dr. Darío Santos  On . Pt developed anterior wound infection on 6/17/2021.     1. Neurologically stable  2. ADAT, mobilze  3. DVT prophylaxis, SCDs  4. PT/OT  5. IV abx - ID consulted   6.  Radha Hurt for pt to be up without brace as she is unable to tolerate brace on the abdomen     Ed Ross RUTLEDGE

## 2021-06-27 NOTE — CONSULTS
General Surgery   Resident Consult Note     Reason for Consult: Post-operative wound infection    History of Present Illness:   Peyton Biggs is a 70 y.o. female with Hx of spinal stenosis who recently underwent L3-4, L4-5 anterior lumbar interbody fusion with posterior decompression fusion/fixation on 05/26 with Dr. Derrell Hill of Neurosurgery, with anterior access obtained by Dr. Abby Washington of Vascular Surgery. The patient states she first noticed some erythema around the superior aspect of her incision on 06/17. She was seen in the Neurosurgery office by the NP on 06/22, and was started on Bactrim. She was then seen by Dr. Abby Washington on 06/22, and her antibiotics were changed to Zyvox. She was then advised by his office that she should be admitted to the hospital today for further evaluation and treatment of her wound infection. Today, she reports that it has intermittently been draining purulent fluid. She denies ever having wound infections in the past. Denies a history of diabetes. She has been washing the wound daily with soap and water and taking her antibiotics as prescribed. She denies any other complaints. Denies fevers. Past Medical History:        Diagnosis Date    GERD (gastroesophageal reflux disease)     Hyperlipidemia     MRSA (methicillin resistant Staphylococcus aureus) colonization 05/26/2021    Nares    Spinal stenosis        Past Surgical History:        Procedure Laterality Date    BACK SURGERY  01/22/2020    CARPAL TUNNEL RELEASE      HYSTERECTOMY      LUMBAR FUSION N/A 5/26/2021    L3-4, L4-5 ANTERIOR LUMBAR INTERBODY FUSION WITH POSTERIOR LUMBAR L3-4. L4-5 DECOMPRESSION FUSION FIXATION USING PARAMEDIAN TECHNIQUES performed by Hiren Lamb MD at [facility]  11/2020       Allergies:  Patient has no known allergies. Medications:   Home Meds  No current facility-administered medications on file prior to encounter.      Current Outpatient Medications on File Prior to Encounter   Medication Sig Dispense Refill    DOCUSATE SODIUM  mg      linezolid (ZYVOX) 600 MG tablet Take 600 mg by mouth every 12 hours      oxyCODONE-acetaminophen (PERCOCET) 5-325 MG per tablet 1-2 tablet: 1-2 TABS AS NEEDED FOR PAIN EVERY 4 HRS.       sulfamethoxazole-trimethoprim (BACTRIM DS;SEPTRA DS) 800-160 MG per tablet Take 1 tablet by mouth 2 times daily      aspirin 81 MG EC tablet Take 1 tablet by mouth daily 30 tablet 3    polyethylene glycol (GLYCOLAX) 17 g packet Take 17 g by mouth daily 527 g 1    Omega-3 Fatty Acids (FISH OIL) 1000 MG CAPS Take 1 capsule by mouth daily 90 capsule 3    ascorbic acid (VITAMIN C) 500 MG tablet Take 500 mg by mouth      melatonin 10 MG CAPS capsule Take by mouth      omeprazole (PRILOSEC) 20 MG delayed release capsule Take 20 mg by mouth      simvastatin (ZOCOR) 40 MG tablet Take 40 mg by mouth      Psyllium (METAMUCIL PO) Take by mouth      Acetaminophen (TYLENOL PO) Take by mouth      methocarbamol (ROBAXIN) 500 MG tablet Take 1-2 tablets by mouth every 6 hours as needed (spasm) 80 tablet 1       Current Meds  sodium chloride flush 0.9 % injection 5-40 mL, 2 times per day  sodium chloride flush 0.9 % injection 5-40 mL, PRN  0.9 % sodium chloride infusion, PRN  enoxaparin (LOVENOX) injection 40 mg, Daily  ondansetron (ZOFRAN-ODT) disintegrating tablet 4 mg, Q8H PRN   Or  ondansetron (ZOFRAN) injection 4 mg, Q6H PRN  polyethylene glycol (GLYCOLAX) packet 17 g, Daily PRN  acetaminophen (TYLENOL) tablet 650 mg, Q6H PRN   Or  acetaminophen (TYLENOL) suppository 650 mg, Q6H PRN  cefepime (MAXIPIME) 2000 mg IVPB minibag, Q12H  0.9 % sodium chloride infusion, Continuous  [START ON 6/27/2021] aspirin EC tablet 81 mg, Daily  melatonin disintegrating tablet 10 mg, Nightly PRN  [START ON 6/27/2021] pantoprazole (PROTONIX) tablet 40 mg, QAM AC  oxyCODONE-acetaminophen (PERCOCET) 5-325 MG per tablet 1 tablet, Q4H PRN   Or  oxyCODONE-acetaminophen (PERCOCET) 5-325 MG per tablet 2 tablet, Q4H PRN  polyethylene glycol (GLYCOLAX) packet 17 g, Daily  methocarbamol (ROBAXIN) tablet 750 mg, 4x Daily PRN  [START ON 6/27/2021] vancomycin (VANCOCIN) 1,000 mg in dextrose 5 % 250 mL IVPB, Q12H  lidocaine-EPINEPHrine 1 %-1:017955 injection 20 mL, Once  silver nitrate applicators applicator 1 each, Once  gabapentin (NEURONTIN) capsule 300 mg, TID PRN  [START ON 6/27/2021] atorvastatin (LIPITOR) tablet 20 mg, Nightly        Family History:   History reviewed. No pertinent family history. Social History:   TOBACCO:   reports that she has never smoked. She has never used smokeless tobacco.  ETOH:   reports previous alcohol use. DRUGS:   reports no history of drug use. Review of Systems:     Constitutional: Negative, as above  HENT: Negative. Eyes: Negative. Respiratory: Negative. Cardiovascular: Negative. Gastrointestinal: Negative  Genitourinary: Negative. Musculoskeletal: Negative. Skin: As above  Endocrine: Negative. Allergic/Immunologic: Negative. Neurological: Negative. Hematological: Negative. Psychiatric/Behavioral: Negative. Physical exam:    Vitals:    06/26/21 1400 06/26/21 1600 06/26/21 1904 06/26/21 2249   BP: 120/60 139/76 (!) 141/72 102/68   Pulse: 82 68 70 80   Resp: 20 16 16 16   Temp:  97.9 °F (36.6 °C) 97.9 °F (36.6 °C) 98.1 °F (36.7 °C)   TempSrc:  Oral Oral Oral   SpO2: 99% 97% 97% 96%   Weight:       Height:           General appearance: alert, sitting up in bed, in NAD  Eyes: EOMI, no scleral icterus  Neck: trachea midline, no JVD  Chest/Lungs: normal effort, no adventitious breath sounds, on RA  Cardiovascular: RRR  Abdomen: obese, soft, midline laparotomy wound well healed at majority of scar with area at most superior portion with blanching erythema, induration, and small opening without active purulent drainage, minimally tender.  No discrete abscess or area of fluctuance appreciated   Skin: warm and dry, no rashes  Extremities: no edema, no cyanosis  Neuro: A&Ox3, no focal deficits, sensation intact            Labs:    CBC:   Recent Labs     06/26/21  0957   WBC 5.8   HGB 13.5   HCT 41.2   MCV 96.0        BMP:   Recent Labs     06/26/21  0957      K 4.3      CO2 23   BUN 21*   CREATININE 0.9     PT/INR: No results for input(s): PROTIME, INR in the last 72 hours. APTT: No results for input(s): APTT in the last 72 hours. Liver Profile:   Lab Results   Component Value Date    AST 21 09/30/2011    ALT 15 09/30/2011    BILITOT 0.70 09/30/2011    ALKPHOS 84 09/30/2011     Lab Results   Component Value Date    CHOL 187 09/30/2011    HDL 60 09/30/2011    TRIG 112 09/30/2011     UA: No results found for: NITRITE, COLORU, PHUR, LABCAST, WBCUA, RBCUA, MUCUS, TRICHOMONAS, YEAST, BACTERIA, CLARITYU, SPECGRAV, LEUKOCYTESUR, UROBILINOGEN, BILIRUBINUR, BLOODU, GLUCOSEU, AMORPHOUS    Imaging:   CT ABDOMEN PELVIS W IV CONTRAST Additional Contrast? None   Final Result      Prior laparotomy with associated inflammatory changes and cutaneous thickening but no focal fluid collection. New mild bilateral hydronephrosis. No radiopaque renal or ureteral calculi visualized. Assessment/Plan: This is a 70 y.o. female with Hx of recent lumbar surgery via anterior approach who presents with post-operative incisional wound infection. - CT scan reviewed, which reveals subcutaneous inflammation and stranding at area of concern at the superior aspect of the incision - no discernable abscess cavity or fluid collection  - Admitting providers requesting general surgery to evaluate wound for incision and drainage, which was discussed with on call neurosurgery team  - plan for bedside incision and drainage - risks, benefits, alternatives discussed with the patient and she agrees to proceed. Procedure note to follow.   - IV antibiotics and continued medical management per primary team     Plan discussed with Dr. Marivel Nice DO  PGY2, General Surgery  06/27/21  12:06 AM  [phone]    I performed a history and physical examination of the patient and discussed management with the resident. I reviewed the resident's note and agree with the documented findings and plan of care.  Wound drained at bedside, continue antibiotics    Leroy Davenport M.D.  6/27/21   2:01 PM

## 2021-06-27 NOTE — PROGRESS NOTES
Patient a/ox4, neuro check WNL w/exception to decreased sensation to BLE. Abdominal surgical sie clean/dry/intact, no odor/drainage noted. Surgical site to lower back approximated and healing well. Patient tolerating PO intake well, denies nausea. Patient up x1 SBA to bathroom w/walker and voiding adequately. Fall precautions in place and bed alarm in use for safety.

## 2021-06-27 NOTE — CONSULTS
Clinical Pharmacy Consult Note    Admit date: 6/26/2021    Subjective/Objective:  70 yof with PMHx of MRSA colonization and spinal stenosis who underwent lumbar surgery on 5/26/21 presented with worsening erythema around the umbilicus incision that she first noticed ~10 days ago. Patient was started on Bactrim but was switched to Zyvox on 6/24. Patient reports lack of improvement on outpatient antibiotic regimen. Patient admitted with surgical site infection for IV antibiotics. Interval update:  S/p I&D of abdominal wound by Surgery. Cultures sent. Pharmacy is consulted to dose Vancomycin per Dr. Feliciano Tim    Pertinent Medications:  Cefepime 2g IV q12h -- day #2  Vancomycin, pharmacy to dose -- day #2    (6/26)   1g IV q12h (6/27-- )       Recent Labs     06/26/21  0957 06/27/21  0609    140   K 4.3 4.7    104   CO2 23 26   BUN 21* 17   CREATININE 0.9 0.8       Estimated Creatinine Clearance: 73 mL/min (based on SCr of 0.8 mg/dL). Recent Labs     06/26/21  0957 06/27/21  0609   WBC 5.8 4.5   HGB 13.5 12.4   HCT 41.2 36.9   MCV 96.0 96.3    210       Height:  5' 6\" (167.6 cm)  Weight: 209 lb (94.8 kg)    Micro: Abscess (6/26): no organisms seen  Blood x2 (6/26): in process    Vancomycin Levels:   6/28  Trough @ 00:30 = ordered    Assessment/Plan:  1. Surgical site infection:  Cefepime + vancomycin -- day #2  Vancomycin - pharmacy to dose  · Currently on 1g IV q12h, after a 1.5g IV loading dose. · Scr stable. Will continue this dose for now. · Trough ordered for 6/28 @ 00:30. Target ~15 mcg/mL. · Renal function will be monitored closely and dosing will be adjusted as appropriate. Please call with any questions.   Doug ArzateD., BCPS   6/27/2021 8:07 AM  Wireless: [phone]

## 2021-06-28 VITALS
DIASTOLIC BLOOD PRESSURE: 76 MMHG | HEART RATE: 78 BPM | TEMPERATURE: 98.3 F | OXYGEN SATURATION: 96 % | HEIGHT: 66 IN | SYSTOLIC BLOOD PRESSURE: 112 MMHG | BODY MASS INDEX: 33.59 KG/M2 | RESPIRATION RATE: 16 BRPM | WEIGHT: 209 LBS

## 2021-06-28 LAB
ALBUMIN SERPL-MCNC: 3.6 G/DL (ref 3.4–5)
ANION GAP SERPL CALCULATED.3IONS-SCNC: 10 MMOL/L (ref 3–16)
BASOPHILS ABSOLUTE: 0 K/UL (ref 0–0.2)
BASOPHILS RELATIVE PERCENT: 0.9 %
BUN BLDV-MCNC: 20 MG/DL (ref 7–20)
CALCIUM SERPL-MCNC: 9.5 MG/DL (ref 8.3–10.6)
CHLORIDE BLD-SCNC: 104 MMOL/L (ref 99–110)
CO2: 26 MMOL/L (ref 21–32)
CREAT SERPL-MCNC: 0.7 MG/DL (ref 0.6–1.2)
EOSINOPHILS ABSOLUTE: 0.2 K/UL (ref 0–0.6)
EOSINOPHILS RELATIVE PERCENT: 4.9 %
GFR AFRICAN AMERICAN: >60
GFR NON-AFRICAN AMERICAN: >60
GLUCOSE BLD-MCNC: 108 MG/DL (ref 70–99)
HCT VFR BLD CALC: 37.6 % (ref 36–48)
HEMOGLOBIN: 12.4 G/DL (ref 12–16)
LYMPHOCYTES ABSOLUTE: 1.1 K/UL (ref 1–5.1)
LYMPHOCYTES RELATIVE PERCENT: 26.4 %
MAGNESIUM: 2 MG/DL (ref 1.8–2.4)
MCH RBC QN AUTO: 31.6 PG (ref 26–34)
MCHC RBC AUTO-ENTMCNC: 33 G/DL (ref 31–36)
MCV RBC AUTO: 95.5 FL (ref 80–100)
MONOCYTES ABSOLUTE: 0.4 K/UL (ref 0–1.3)
MONOCYTES RELATIVE PERCENT: 9.3 %
NEUTROPHILS ABSOLUTE: 2.5 K/UL (ref 1.7–7.7)
NEUTROPHILS RELATIVE PERCENT: 58.5 %
PDW BLD-RTO: 15.1 % (ref 12.4–15.4)
PHOSPHORUS: 4.1 MG/DL (ref 2.5–4.9)
PLATELET # BLD: 221 K/UL (ref 135–450)
PMV BLD AUTO: 8.1 FL (ref 5–10.5)
POTASSIUM SERPL-SCNC: 4.2 MMOL/L (ref 3.5–5.1)
RBC # BLD: 3.94 M/UL (ref 4–5.2)
SODIUM BLD-SCNC: 140 MMOL/L (ref 136–145)
VANCOMYCIN TROUGH: 15.2 UG/ML (ref 10–20)
WBC # BLD: 4.2 K/UL (ref 4–11)

## 2021-06-28 PROCEDURE — 97535 SELF CARE MNGMENT TRAINING: CPT

## 2021-06-28 PROCEDURE — 36415 COLL VENOUS BLD VENIPUNCTURE: CPT

## 2021-06-28 PROCEDURE — 97116 GAIT TRAINING THERAPY: CPT

## 2021-06-28 PROCEDURE — 97165 OT EVAL LOW COMPLEX 30 MIN: CPT

## 2021-06-28 PROCEDURE — 99233 SBSQ HOSP IP/OBS HIGH 50: CPT | Performed by: INTERNAL MEDICINE

## 2021-06-28 PROCEDURE — 83735 ASSAY OF MAGNESIUM: CPT

## 2021-06-28 PROCEDURE — 80069 RENAL FUNCTION PANEL: CPT

## 2021-06-28 PROCEDURE — 80202 ASSAY OF VANCOMYCIN: CPT

## 2021-06-28 PROCEDURE — 85025 COMPLETE CBC W/AUTO DIFF WBC: CPT

## 2021-06-28 PROCEDURE — 2580000003 HC RX 258: Performed by: INTERNAL MEDICINE

## 2021-06-28 PROCEDURE — 6360000002 HC RX W HCPCS: Performed by: INTERNAL MEDICINE

## 2021-06-28 PROCEDURE — 97161 PT EVAL LOW COMPLEX 20 MIN: CPT

## 2021-06-28 PROCEDURE — 6370000000 HC RX 637 (ALT 250 FOR IP): Performed by: INTERNAL MEDICINE

## 2021-06-28 RX ORDER — GABAPENTIN 300 MG/1
300 CAPSULE ORAL 3 TIMES DAILY
Qty: 3 CAPSULE | Refills: 0
Start: 2021-06-28 | End: 2021-06-29

## 2021-06-28 RX ADMIN — GABAPENTIN 300 MG: 300 CAPSULE ORAL at 09:56

## 2021-06-28 RX ADMIN — Medication 10 ML: at 09:56

## 2021-06-28 RX ADMIN — MUPIROCIN: 20 OINTMENT TOPICAL at 09:57

## 2021-06-28 RX ADMIN — GABAPENTIN 300 MG: 300 CAPSULE ORAL at 13:14

## 2021-06-28 RX ADMIN — Medication 10 ML: at 00:55

## 2021-06-28 RX ADMIN — VANCOMYCIN HYDROCHLORIDE 1000 MG: 10 INJECTION, POWDER, LYOPHILIZED, FOR SOLUTION INTRAVENOUS at 13:13

## 2021-06-28 RX ADMIN — PANTOPRAZOLE SODIUM 40 MG: 40 TABLET, DELAYED RELEASE ORAL at 05:05

## 2021-06-28 RX ADMIN — OXYCODONE AND ACETAMINOPHEN 1 TABLET: 5; 325 TABLET ORAL at 05:05

## 2021-06-28 RX ADMIN — DOCUSATE SODIUM 100 MG: 100 CAPSULE, LIQUID FILLED ORAL at 09:56

## 2021-06-28 RX ADMIN — VANCOMYCIN HYDROCHLORIDE 1000 MG: 10 INJECTION, POWDER, LYOPHILIZED, FOR SOLUTION INTRAVENOUS at 00:55

## 2021-06-28 RX ADMIN — ASPIRIN 81 MG: 81 TABLET, COATED ORAL at 09:56

## 2021-06-28 RX ADMIN — ENOXAPARIN SODIUM 40 MG: 40 INJECTION SUBCUTANEOUS at 09:56

## 2021-06-28 RX ADMIN — POLYETHYLENE GLYCOL 3350 17 G: 17 POWDER, FOR SOLUTION ORAL at 09:56

## 2021-06-28 ASSESSMENT — PAIN SCALES - GENERAL
PAINLEVEL_OUTOF10: 4
PAINLEVEL_OUTOF10: 0
PAINLEVEL_OUTOF10: 0

## 2021-06-28 ASSESSMENT — PAIN DESCRIPTION - FREQUENCY: FREQUENCY: CONTINUOUS

## 2021-06-28 ASSESSMENT — PAIN DESCRIPTION - PROGRESSION: CLINICAL_PROGRESSION: GRADUALLY WORSENING

## 2021-06-28 ASSESSMENT — PAIN - FUNCTIONAL ASSESSMENT: PAIN_FUNCTIONAL_ASSESSMENT: ACTIVITIES ARE NOT PREVENTED

## 2021-06-28 ASSESSMENT — PAIN DESCRIPTION - ORIENTATION: ORIENTATION: ANTERIOR

## 2021-06-28 ASSESSMENT — PAIN DESCRIPTION - DESCRIPTORS: DESCRIPTORS: TENDER;SORE

## 2021-06-28 ASSESSMENT — PAIN DESCRIPTION - LOCATION: LOCATION: ABDOMEN

## 2021-06-28 ASSESSMENT — PAIN DESCRIPTION - ONSET: ONSET: ON-GOING

## 2021-06-28 ASSESSMENT — PAIN DESCRIPTION - PAIN TYPE: TYPE: SURGICAL PAIN

## 2021-06-28 NOTE — CARE COORDINATION
Case Management Assessment            Discharge Note                    Date / Time of Note: 6/28/2021 2:58 PM                  Discharge Note Completed by: Orlin Hernandez RN    Patient Name: Beto Matt   YOB: 1945  Diagnosis: Surgical site infection Zenon Crystal   Date / Time: 6/26/2021  9:27 AM    Current PCP: Jalil CARUSO patient: No    Hospitalization in the last 30 days: No    Advance Directives:  Code Status: Full Code  [place] DNR form completed and on chart: Not Indicated    Financial:  Payor: Isabel Luu / Plan: [facility] HMO / Product Type: *No Product type* /      Pharmacy:    [facility] #[id number] - [address] [facility] [phone]  Shane Escobar [address] [place] [address]  Phone: [phone] Fax: [phone]      Assistance purchasing medications?: Potential Assistance Purchasing Medications: No  Assistance provided by Case Management: None at this time    Does patient want to participate in local refill/ meds to beds program?:      Meds To Beds General Rules:  1. Can ONLY be done Monday- Friday between 8:30am-5pm  2. Prescription(s) must be in pharmacy by 3pm to be filled same day  3. Copy of patient's insurance/ prescription drug card and patient face sheet must be sent along with the prescription(s)  4. Cost of Rx cannot be added to hospital bill. If financial assistance is needed, please contact unit  or ;  or  CANNOT provide pharmacy voucher for patients co-pays  5.  Patients can then  the prescription on their way out of the hospital at discharge, or pharmacy can deliver to the bedside if staff is available. (payment due at time of pick-up or delivery - cash, check, or card accepted)     Able to afford home medications/ co-pay costs: Yes    ADLS:  Current PT AM-PAC Score: 23 /24  Current OT AM-PAC Score: 23 /24      DISCHARGE Disposition: Home with [address]: [facility]     LOC at discharge: Not Applicable  CARMEN Completed: Not Indicated    Notification completed in HENS/PAS?:  Not Applicable    IMM Completed:   Not Indicated    Transportation:  Transportation PLAN for discharge: family   Mode of Transport: [facility]  ordered at discharge: Yes  [address]: Shanthi Hay  Phone: [phone]  Fax: [phone]  Orders faxed: Yes    Durable Medical Equipment:  DME Provider: n/a  Equipment obtained during hospitalization: n/a    Home Oxygen and Respiratory Equipment:  Oxygen needed at discharge?: Not [address]: Not Applicable  Portable tank available for discharge?: Not Indicated      Additional CM Notes: Patient is from home alone, family support, uses rollator. Patient will return home and resume care with [facility]. CM discussed with RICARDO Means to send extra dressing changes supplies home with patient. Patient's son to transport home at discharge. The Plan for Transition of Care is related to the following treatment goals of Surgical site infection [T81.49XA]    The Patient and/or patient representative Elham Garay and her family were provided with a choice of provider and agrees with the discharge plan Yes    Freedom of choice list was provided with basic dialogue that supports the patient's individualized plan of care/goals and shares the quality data associated with the providers.  Yes    Care Transitions patient: No    Caitlyn Boothe RN  [facility] [employer]  Case Management Department  Ph: [phone]  Fax: [phone]

## 2021-06-28 NOTE — PROGRESS NOTES
ID Follow-up NOTE    CC:   Lumbar SSI, MRSA infection  Antibiotics: Vancomycin    Admit Date: 6/26/2021  Hospital Day: 3    Subjective:     Patient reports less abd wall / wound pain  No other complaint    Objective:     Patient Vitals for the past 8 hrs:   BP Temp Temp src Pulse Resp SpO2   06/28/21 0715 122/71 97.9 °F (36.6 °C) Oral 63 16 94 %   06/28/21 0307 128/75 97.7 °F (36.5 °C) Oral 63 16 95 %     I/O last 3 completed shifts: In: 500 [P.O.:480; I.V.:20]  Out: -   No intake/output data recorded. EXAM:  GENERAL: No apparent distress. RA  HEENT: Membranes moist, no oral lesion  NECK:  Supple, no lymphadenopathy  LUNGS: Clear b/l, no rales, no dullness  CARDIAC: RRR, no murmur appreciated  ABD:  + BS, soft / NT - wound clean with no purulence, surrounding erythema and induration   EXT:  No rash, no edema, no lesions  NEURO: No focal neurologic findings  PSYCH: Orientation, sensorium, mood normal  LINES:  Peripheral iv       Data Review:  Lab Results   Component Value Date    WBC 4.2 06/28/2021    HGB 12.4 06/28/2021    HCT 37.6 06/28/2021    MCV 95.5 06/28/2021     06/28/2021     Lab Results   Component Value Date    CREATININE 0.7 06/28/2021    BUN 20 06/28/2021     06/28/2021    K 4.2 06/28/2021     06/28/2021    CO2 26 06/28/2021       Hepatic Function Panel:   Lab Results   Component Value Date    ALKPHOS 84 09/30/2011    ALT 15 09/30/2011    AST 21 09/30/2011    PROT 7.5 09/30/2011    BILITOT 0.70 09/30/2011    LABALBU 3.6 06/28/2021       Cultures:   5/26     MRSA nasal screen + (preop)    6/25     Wound culture +MRSA (outpt - Tra Hossein)  Gram Stain  Few Squamous Epithelial CellsFew Polymorphonuclear Leukocytes Seen . No Organisms Seen .        Culture Result:  Light Growth :High        Oxacillin  >2Resistant (R)       Levofloxacin  <=0.5Sensitive (S)       Erythromycin  >4Resistant (R)       Clindamycin  <=0.25Sensitive (S)       Tetracycline  <=1Sensitive (S) Sulfa/Trimethoprim  <=0.5/9.5Sensitive (S)       Vancomycin  1Sensitive (S)       6/26     BC x2 NGTD              Wound culture - light MRSA              Wound culture - light MRSA        Radiology Review:  All pertinent images / reports were reviewed as a part of this visit. CT abd 6/26/21  Impression       Prior laparotomy with associated inflammatory changes and cutaneous thickening but no focal fluid collection.       New mild bilateral hydronephrosis. No radiopaque renal or ureteral calculi visualized. Scheduled Meds:   gabapentin  300 mg Oral TID    mupirocin   Nasal BID    docusate sodium  100 mg Oral Daily    sodium chloride flush  5-40 mL Intravenous 2 times per day    enoxaparin  40 mg Subcutaneous Daily    aspirin  81 mg Oral Daily    pantoprazole  40 mg Oral QAM AC    polyethylene glycol  17 g Oral Daily    vancomycin  1,000 mg Intravenous Q12H    lidocaine-EPINEPHrine  20 mL Intradermal Once    silver nitrate applicators  1 each Topical Once    atorvastatin  20 mg Oral Nightly       Continuous Infusions:   sodium chloride         PRN Meds:  sodium chloride flush, sodium chloride, ondansetron **OR** ondansetron, polyethylene glycol, acetaminophen **OR** acetaminophen, melatonin, oxyCODONE-acetaminophen **OR** oxyCODONE-acetaminophen, methocarbamol      Assessment:     HL, CLARK, obesity (BMI 33)  Lumbar stenosis  5/26 - L3/4, L 4/5 anterior / posterior decompression with fusion (Dr Jose Ang)    SSI - felt to be superficial  - Anterior / abd wound redness /drainage, outpt bactrim / linezolid  - cult 6/25 MRSA (S bactrim, vanco BOO 1)  - admit 6/26, I&D (bedside), cult MRSA      Plan:     Cont vancomycin  Wound care per Surg    Home on po - linezolid until gone (has 2-3 days then bactrim ds bid x 7-10 days - has meds at home)  Wound care  F/u Neurosurg (Dr Jose Ang), Surg (Dr Fam Diallo)  Call my office at end of week to update me on infection    Medical Decision Making:   The following items were considered in medical decision making:  Discussion of patient care with other providers  Reviewed clinical lab tests  Reviewed radiology tests  Reviewed other diagnostic tests/interventions  Microbiology cultures reviewed      Spent 36 minutes on visit    Discussed with pt, Neurosurg student, RN. Messaged Dr Katrina Ruiz.   Bettie Hill MD

## 2021-06-28 NOTE — PROGRESS NOTES
Pt is A/O x4. VSS. Dressing to abdomen is CDI. Pt states pain is a 3/10 and states this is manageable for her. Up w/ walker, tolerating ambulation well. Fall precautions in place. Will continue to monitor.

## 2021-06-28 NOTE — DISCHARGE INSTR - COC
Continuity of Care Form    Patient Name: Roland Mccormick   :  1945  MRN:  [id number]    Admit date:  2021  Discharge date:  ***    Code Status Order: Full Code   Advance Directives:   Advance Care Flowsheet Documentation       Date/Time Healthcare Directive Type of Healthcare Directive Copy in [address] Agent's Name Healthcare Agent's Phone Number    [phone]  Yes, patient has an advance directive for healthcare treatment  Durable power of  for health care;Living will  Yes, copy in chart  --  Ashok Murillo  [phone]            Admitting Physician:  Kesha Garza DO  PCP: Nadiya Cooney    Discharging Nurse: [facility] Unit/Room#: 7920/6879-12  Discharging Unit Phone Number: ***    Emergency Contact:   Extended Emergency Contact Information  Primary Emergency Contact: [address] [place] [address] Phone: [phone]  Relation: Child   needed? No  Secondary Emergency Contact: Primo Margarita  [facility] Phone: [phone]  Relation: Child    Past Surgical History:  Past Surgical History:   Procedure Laterality Date    BACK SURGERY  2020    CARPAL TUNNEL RELEASE      HYSTERECTOMY      LUMBAR FUSION N/A 2021    L3-4, L4-5 ANTERIOR LUMBAR INTERBODY FUSION WITH POSTERIOR LUMBAR L3-4.  L4-5 DECOMPRESSION FUSION FIXATION USING PARAMEDIAN TECHNIQUES performed by Colette Hatchet, MD at [facility]  2020       Immunization History:   Immunization History   Administered Date(s) Administered    COVID-19, Moderna, PF, 100mcg/0.5mL 2021, 2021       Active Problems:  Patient Active Problem List   Diagnosis Code    Lumbar stenosis with neurogenic claudication M48.062    S/P lumbar fusion Z98.1    Surgical site infection T81.49XA    Cellulitis of abdominal wall L03.311    MRSA infection A49.02       Isolation/Infection:   Isolation            Contact          Patient Infection Status       Infection Onset Added Last Indicated Last Indicated By Review Planned Expiration Resolved Resolved By    MRSA 06/26/21 06/28/21 06/26/21 Culture, Wound        Wound cx from [facility] on 6/25    Resolved    MRSA 05/26/21 05/27/21 05/26/21 MRSA DNA Probe, Nasal   05/28/21 Ken Carrel Post, RN    No isolation required for MRSA colonization @ [facility]            Nurse Assessment:  Last Vital Signs: BP (!) 116/55   Pulse 68   Temp 98.2 °F (36.8 °C) (Oral)   Resp 16   Ht 5' 6\" (1.676 m)   Wt 209 lb (94.8 kg)   SpO2 94%   BMI 33.73 kg/m²     Last documented pain score (0-10 scale): Pain Level: 0  Last Weight:   Wt Readings from Last 1 Encounters:   06/26/21 209 lb (94.8 kg)     Mental Status:  {IP PT MENTAL STATUS:20030:::0}    IV Access:  { CARMEN IV ACCESS:920193265:::0}    Nursing Mobility/ADLs:  Walking   {CHP DME ADLs:173220320:::0}  Transfer  {CHP DME ADLs:646987694:::0}  Bathing  {CHP DME ADLs:122341884:::0}  Dressing  {CHP DME ADLs:018854802:::0}  Toileting  {CHP DME ADLs:900120989:::0}  Feeding  {CHP DME ADLs:525189597:::0}  Med Admin  {CHP DME ADLs:927937724:::0}  Med Delivery   { CARMEN MED Delivery:709289460:::0}    Wound Care Documentation and Therapy:        Elimination:  Continence: Bowel: {YES / DO:89465}  Bladder: {YES / CL:18549}  Urinary Catheter: {Urinary Catheter:661797982:::0}   Colostomy/Ileostomy/Ileal Conduit: {YES / QS:41766}       Date of Last BM: ***    Intake/Output Summary (Last 24 hours) at 6/28/2021 1307  Last data filed at 6/28/2021 1007  Gross per 24 hour   Intake 380 ml   Output --   Net 380 ml     I/O last 3 completed shifts:   In: 500 [P.O.:480; I.V.:20]  Out: -     Safety Concerns:     [address] Safety Concerns:280916010:::0}    Impairments/Disabilities:      [address] Impairments/Disabilities:932443063:::0}    Nutrition Therapy:  Current Nutrition Therapy:   [address] Diet List:586516550:::0}    Routes of Feeding: {CHP DME Other Feedings:086298251:::0}  Liquids: {Slp liquid thickness:41049}  Daily Fluid Restriction: {CHP DME Yes amt example:944689204:::0}  Last Modified Barium Swallow with Video (Video Swallowing Test): {Done Not Done ZTXO:893861666:::7}    Treatments at the Time of Hospital Discharge:   Respiratory Treatments: ***  Oxygen Therapy:  {Therapy; copd oxygen:42400:::0}  Ventilator:    {[facility] Vent List:378218478:::0}    Rehab Therapies: {THERAPEUTIC INTERVENTION:8343856641}  Weight Bearing Status/Restrictions: { CC Weight Bearin:::0}  Other Medical Equipment (for information only, NOT a DME order):  {EQUIPMENT:210043498}  Other Treatments: ***    Patient's personal belongings (please select all that are sent with patient):  {CHP DME Belongings:608996432:::0}    RN SIGNATURE:  {Esignature:345264596:::0}    CASE MANAGEMENT/SOCIAL WORK SECTION    Inpatient Status Date: ***    Readmission Risk Assessment Score:  Readmission Risk              Risk of Unplanned Readmission:  11           Discharging to Facility/ Agency   Name:   Address:  Phone:  Fax:    Dialysis Facility (if applicable)   Name:  Address:  Dialysis Schedule:  Phone:  Fax:    / signature: {Esignature:535128585:::0}    PHYSICIAN SECTION    Prognosis: Good    Condition at Discharge: Stable    Rehab Potential (if transferring to Rehab): Good    Recommended Labs or Other Treatments After Discharge:    - Continue BID packing changes for surgical site infection    Physician Certification: I certify the above information and transfer of Jenny Lopez  is necessary for the continuing treatment of the diagnosis listed and that she requires Home Care for less 30 days.      Update Admission H&P: No change in H&P    PHYSICIAN SIGNATURE:  Electronically signed by Rosamaria Mojica MD on 21 at 1:08 PM EDT

## 2021-06-28 NOTE — PROGRESS NOTES
NEUROSURGERY PROGRESS NOTE    6/28/2021 10:16 AM                               Keyon Castro                      LOS: 2 days     Chief Complaint: Post-op wound infection     Subjective:  70 w/o female s/p ALIF on 5/26/21 by Dr. Luis Newman presented on 6/26/21 with drainage from incision since 6/17/21. +MRSA on 6/25. Underwent I&D on 6/26 by general surgery. No acute events overnight. Patient reports tenderness of abdominal incision, states pain is improving. Dr. Dc Ramirez at bedside during exam to assess wound and discuss antibiotic treatment plan with patient. Denies extremities numbness or weakness. REVIEW OF SYSTEMS:    CONSTITUTIONAL:   No fever. Endorses abdominal wound tenderness.   EYES:  negative for blurred vision, visual disturbance  HEENT:  negative for acute hearing loss, tinnitus  RESPIRATORY: negative for cough, shortness of breath, hemoptysis  CARDIOVASCULAR:  negative for chest pain, palpitations, exertional chest pressure/discomfort, edema, syncope  GASTROINTESTINAL:  negative for nausea, vomiting, diarrhea, constipation  GENITOURINARY:  negative for frequency, dysuria, urinary incontinence, decreased urine volume, and hematuria  MUSCULOSKELETAL:  negative for acute joint swelling, decreased range of motion and muscle weakness  NEUROLOGICAL:  negative for headaches, slurred speech, unilateral weakness     gabapentin  300 mg Oral TID    mupirocin   Nasal BID    docusate sodium  100 mg Oral Daily    sodium chloride flush  5-40 mL Intravenous 2 times per day    enoxaparin  40 mg Subcutaneous Daily    aspirin  81 mg Oral Daily    pantoprazole  40 mg Oral QAM AC    polyethylene glycol  17 g Oral Daily    vancomycin  1,000 mg Intravenous Q12H    lidocaine-EPINEPHrine  20 mL Intradermal Once    silver nitrate applicators  1 each Topical Once    atorvastatin  20 mg Oral Nightly     sodium chloride flush, sodium chloride, ondansetron **OR** ondansetron, polyethylene glycol, acetaminophen **OR** acetaminophen, melatonin, oxyCODONE-acetaminophen **OR** oxyCODONE-acetaminophen, methocarbamol    Physical Exam:  Patient seen and examined    Vitals:    06/28/21 0715   BP: 122/71   Pulse: 63   Resp: 16   Temp: 97.9 °F (36.6 °C)   SpO2: 94%     GCS:  4 - Opens eyes on own  5 - Alert and oriented  6 - Follows simple motor commands  General: Well developed. Alert and cooperative in no acute distress. HENT: atraumatic, neck supple  Eyes: Optic discs: Not tested  Pulmonary: unlabored respiratory effort  Cardiovascular:  Warm well perfused. No peripheral edema  Gastrointestinal: abdomen soft, NT, ND    Neurological:  Mental Status: Awake, alert, oriented x 4, speech clear and appropriate  Attention: Intact  Language: No aphasia or dysarthria noted  Sensation: Intact to all extremities to light touch  Coordination: Intact  DTRs:    Right  Left    Rodarte's - -   ankle clonus  - -   toes (babinski)  - -     Cranial Nerves:  II: Visual acuity not tested, denies new visual changes / diplopia  III, IV, VI: PERRL, 3 mm bilaterally, EOMI, no nystagmus noted  V: Facial sensation intact bilaterally to touch  VII: Face symmetric  VIII: Hearing intact bilaterally to spoken voice  IX: Palate movement equal bilaterally  XI: Shoulder shrug equal bilaterally  XII: Tongue midline    Musculoskeletal:   Gait: Not tested   Assist devices: None   Tone: normal  Motor strength:    Right  Left    Right  Left    Deltoid  5 5  Hip Flex  5 5   Biceps  5 5  Knee Extensors  5 5   Triceps  5 5  Knee Flexors  5 5   Wrist Ext  5 5  Ankle Dorsiflex. 5 5   Wrist Flex  5 5  Ankle Plantarflex. 5 5   Handgrip  5 5  Ext Piyush Longus  5 5   Thumb Ext  5 5         Incision: posterior sites healing with not erythema, swelling, or open areas. Anterior site open with erythema and tenderness, site backed and covered with gauze. Quarter size amount of serosanguinous drainage on gauze.     Radiological Findings:    CT ABDOMEN PELVIS W IV CONTRAST  Result Date: 6/26/2021  Prior laparotomy with associated inflammatory changes and cutaneous thickening but no focal fluid collection. New mild bilateral hydronephrosis. No radiopaque renal or ureteral calculi visualized. Labs:  Recent Labs     06/28/21  0534   WBC 4.2   HGB 12.4   HCT 37.6          Recent Labs     06/28/21  0534      K 4.2      CO2 26   BUN 20   CREATININE 0.7   GLUCOSE 108*   CALCIUM 9.5   PHOS 4.1   MG 2.00       No results for input(s): PROTIME, INR, APTT in the last 72 hours. Patient Active Problem List    Diagnosis Date Noted    Surgical site infection 06/26/2021    S/P lumbar fusion 05/28/2021    Lumbar stenosis with neurogenic claudication 05/26/2021       Assessment:  Patient is a 70 y.o. female s/p L3-5 ALIF with Dr. Aysha Dominguez on 5/26/21 presenting on 6/26 with anterior surgical site infection, s/p I&D. Plan:  1. Neurologically stable  2. For change in exam MUST contact neurosurgery team along with primary team  3. Antibiotics: Vancomycin. Antibiotic treatment managed by infectious diseases. 4. GI Prophylaxis: Protonix  5. Bowel Regimen: Glycolax and colace  6. Pain control: Tylenol & PRN Roxicodone and Dilaudid  7. Muscle spasms: PRN Robaxin  8. Mobility: PT/OT as tolerates  9. Diet: Advance as tolerates  10. DVT Prophylaxis: SCD's & Lovenox  11. Will follow inpatient. Please call with any questions or decline in neurological status    DISPO: Dispo timing to be determined by primary team once patient is medically stable for discharge. Patient discussed with with Dr. Aysha Dominguez who agrees with above assessment and plan.      Electronically signed by: CHALO Arteaga, 6/28/2021 10:16 AM  [phone]

## 2021-06-28 NOTE — PLAN OF CARE
Problem: Infection:  Goal: Will remain free from infection  Description: Will remain free from infection  Outcome: Ongoing  Note: Pt receiving IV antibiotics. Dressings changed by surgical team. Pt educated on signs of worsening infection. Problem: Safety:  Goal: Free from accidental physical injury  Description: Free from accidental physical injury  Outcome: Ongoing  Note: Pt is in bed with alarm on. Non-skid socks are on. Up x1 with walker, tolerates ambulation well. Fall precautions in place. Call light and bedside table in reach.

## 2021-06-28 NOTE — PROGRESS NOTES
Physical Therapy    Facility/Department: [facility] 5T ORTHO/NEURO  Initial Assessment / treatment / discharge    NAME: Jenny Lopez  : 1945  MRN: [id number]    Date of Service: 2021    Discharge Recommendations: Jenny Lopez scored a 23/24 on the AM-PAC short mobility form. At this time, no further PT is recommended upon discharge. Recommend patient returns to prior setting with prior services. PT Equipment Recommendations  Equipment Needed: No    Assessment   Body structures, Functions, Activity limitations: Decreased functional mobility   Assessment: Pt is 70 y.o. female admit with post op wound infection. Pt is at her baseline from a functional mobility standpoint, ambulating with steady gait with use of rollator. Pt denies concerns with mobility at d/c.  Plans to return home with assist prn from family. No acute PT needs. Will sign off. Decision Making: Low Complexity  PT Education: PT Role;Functional Mobility Training  Patient Education: pt demonstrates understanding  REQUIRES PT FOLLOW UP: No       Patient Diagnosis(es): The encounter diagnosis was Cellulitis of abdominal wall.     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 has a past surgical history that includes back surgery (2020); Carpal tunnel release; Hysterectomy; Nasal septum surgery (2020); and lumbar fusion (N/A, 2021).     Restrictions  Position Activity Restriction  Other position/activity restrictions: activity as tolerated, ok to go without brace per neurosurg notes  Vision/Hearing  Vision: Within Functional Limits  Hearing: Within functional limits     Subjective  General  Chart Reviewed: Yes  Patient assessed for rehabilitation services?: Yes  Additional Pertinent Hx: Admit  with concern for infection after L3-L4, L4-L5 ALIF on ;  I&D of abdominal wound; PMHx: lumbar fusion 5/26/21  Referring Practitioner: DO Alonzo  Diagnosis: surgical site infection  Subjective  Subjective: Pt found supine in bed. Pleasant and agreeable to PT.   Pain Screening  Patient Currently in Pain: Yes (\"its not bad\" abdominal pain)       Orientation  Orientation  Overall Orientation Status: Within Normal Limits  Social/Functional History  Social/Functional History  Lives With: Alone  Type of Home: House  Home Layout: One level  Home Access: Stairs to enter without rails  Entrance Stairs - Number of Steps: 1 JUAN - cart she holds onto  [employer] Shower/Tub: Walk-in shower  Bathroom Toilet: Standard (raised seat over the top)  Bathroom Equipment: Shower chair  Home Equipment: 4 wheeled walker, Sock aid, Reacher  Receives Help From: Family  ADL Assistance: Independent  Homemaking Assistance: Independent (family has been helping with heavy cleaning recently)  Homemaking Responsibilities: Yes  Ambulation Assistance: Independent (w/ rollator since surgery in May)  Transfer Assistance: Independent  Active : No (has not driven since back surgery in May)  Patient's  Info: son helps w/ groceries  Occupation: Retired  Type of occupation: public [employer] Tim: [employer]  Additional Comments: Pt reports no recent falls  Cognition        Objective          AROM RLE (degrees)  RLE AROM: WFL  AROM LLE (degrees)  LLE AROM : WFL  Strength RLE  Strength RLE: WFL  Strength LLE  Strength LLE: WFL        Bed mobility  Supine to Sit: Modified independent (HOB elevated, via log roll)  Sit to Supine: Modified independent (HOB elevated)  Scooting: Modified independent  Transfers  Sit to Stand: Modified independent  Stand to sit: Modified independent  Ambulation  Ambulation?: Yes  Ambulation 1  Device: Rollator  Assistance: Modified Independent  Quality of Gait: decreased but steady loni, steady gait, able to turn head and converse without difficulty, appropriate stride length  Distance: 900 ft  Stairs/Curb  Stairs?: Yes (negotiated curb step with rollator with SBA)     Balance  Sitting - Static: Good  Sitting - Dynamic: Good  Standing - Static: Good  Standing - Dynamic: Good      Treatment included gait and transfer training, pt education.   Plan   Plan  Times per week: d/c acute PT  Safety Devices  Type of devices: Call light within reach, Nurse notified, Gait belt, Left in bed, Bed alarm in place    G-Code       OutComes Score                                                  AM-PAC Score  AM-PAC Inpatient Mobility Raw Score : 23 (06/28/21 1229)  AM-PAC Inpatient T-Scale Score : 56.93 (06/28/21 1229)  Mobility Inpatient CMS 0-100% Score: 11.2 (06/28/21 1229)  Mobility Inpatient CMS G-Code Modifier : CI (06/28/21 1229)          Goals          Therapy Time   Individual Concurrent Group Co-treatment   Time In 1040         Time Out 1120         Minutes 40               Timed Code Treatment Minutes:  25    Total Treatment Minutes:  [address] [place], [place]  [id number]

## 2021-06-28 NOTE — PROGRESS NOTES
Patient a/o x4, no changes to neuro status. Patient assisted w/shower this shift. Complete linen change provided. General surgery rounded and new dressing applied to abdominal wound. Redness noted to PIV in L FA. Patient denies tenderness/pain to palpation, no swelling noted. Patient resting well overnight.

## 2021-06-28 NOTE — PROGRESS NOTES
Clinical Pharmacy Progress Note    Admit date: 6/26/2021    Subjective/Objective:  70 yof with PMHx of MRSA colonization and spinal stenosis who underwent lumbar surgery on 5/26/21 presented with worsening erythema around the umbilicus incision that she first noticed ~10 days ago. Patient was started on Bactrim but was switched to Zyvox on 6/24. Patient reports lack of improvement on outpatient antibiotic regimen. Patient admitted with surgical site infection for IV antibiotics. Interval update:  Wound cultures growing MRSA. Cefepime discontinued per ID 6/27. Pharmacy is consulted to dose Vancomycin per Dr. Myriam Abernathy    Pertinent Medications:   (6/26-6/27)  Vancomycin, pharmacy to dose -- day #3    (6/26)   1g IV q12h (6/27-current)       Recent Labs     06/27/21  0609 06/28/21  0534    140   K 4.7 4.2    104   CO2 26 26   PHOS  --  4.1   BUN 17 20   CREATININE 0.8 0.7       Estimated Creatinine Clearance: 83 mL/min (based on SCr of 0.7 mg/dL). Recent Labs     06/27/21  0609 06/28/21  0534   WBC 4.5 4.2   HGB 12.4 12.4   HCT 36.9 37.6   MCV 96.3 95.5    221       Height:  5' 6\" (167.6 cm)  Weight: 209 lb (94.8 kg)    Microbiology:  Blood (6/26):  NGTD x 2  Abscess, abdomen (6/26):  Light growth MRSA       Susceptibilities pending  Wound culture - abdominal abscess (6/26): Light growth MRSA    Vancomycin Levels:   Trough = 15.2 mcg/mL 6/28 00:17 - timed appropriately on 1g IV q12h    Assessment/Plan:  1. Surgical site infection:  Vancomycin -- day #3  Vancomycin - pharmacy to dose  · Trough = 15.2 mcg/mL overnight, timed appropriately on 1g IV q12h. Renal function remains stable. · Will continue current dose. · Renal function will be monitored closely and dosing will be adjusted as appropriate. Please call with any questions.   Huey VillelaD, BCPS  Wireless: [id number]   6/28/2021 8:43 AM

## 2021-06-28 NOTE — PLAN OF CARE
Problem: Pain:  Goal: Pain level will decrease  Description: Pain level will decrease  Outcome: Ongoing  Note: Patient denies any c/o pain at rest, however patient has c/o pain/discomfort w/dressing changes. Patient pre-medicated prior to dressing changes. Problem: Falls - Risk of:  Goal: Will remain free from falls  Description: Will remain free from falls  Outcome: Ongoing  Note: Hourly rounding on patient for needs. Non-skid socks on, bed in lowest position and locked. Bedside table, personal belongs, and nurse call light within reach. Instructed patient to use call light for assistance. Bed alarm on. Floor clear of clutter. Patient remains free of falls at this time.

## 2021-06-28 NOTE — PROGRESS NOTES
Occupational Therapy   Occupational Therapy Initial Assessment, Treatment and Discharge  Date: 2021   Patient Name: Gabriele Bosch  MRN: [id number]     : 1945    Date of Service: 2021    Discharge Recommendations: Gabriele Bosch scored a 23/24 on the -[facility] ADL Inpatient form. At this time, no further OT is recommended upon discharge. Recommend patient returns to prior setting with prior services. OT Equipment Recommendations  Equipment Needed: No  Other: pt has all needed DME    Assessment   Assessment: Pt from home alone. Pt son lives nearby and provides assist PRN. Pt demos functioning at/near baseline level - able to complete ADLs, mobility and transfers w/ SBA progressing to supervision. Pt educated on importance of maintaining activity level, defer to nursing. Pt reports no therapy concerns for returning home. Pt has all needed DME. No acute OT needs. Will sign off services. Prognosis: Good  Decision Making: Low Complexity  OT Education: OT Role;Plan of Care;Precautions  Patient Education: Pt verbalized understanding, pt verbalize and demo following of spinal precautions  REQUIRES OT FOLLOW UP: No  Activity Tolerance  Activity Tolerance: Patient Tolerated treatment well  Safety Devices  Safety Devices in place: Yes  Type of devices: Call light within reach; Chair alarm in place; Left in chair;Nurse notified           Patient Diagnosis(es): The encounter diagnosis was Cellulitis of abdominal wall.     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 has a past surgical history that includes back surgery (2020); Carpal tunnel release; Hysterectomy; Nasal septum surgery (2020); and lumbar fusion (N/A, 2021).            Restrictions  Position Activity Restriction  Other position/activity restrictions: activity as tolerated, ok to go without brace per neurosurg notes    Subjective   General  Chart Reviewed: Yes  Patient assessed for rehabilitation services?: Yes  Additional Pertinent Hx: admit 6/26 w/ surgical site infection; s/p spinal surgery 5/26; CT abdomen/pelvis - inflammatory changes and cutaneous thickening, mild bilateral hydronephrosis; PMHx- HLD, spinal stenosis  Response to previous treatment: Patient with no complaints from previous session  Family / Caregiver Present: No  Diagnosis: surgical site infection  Subjective  Subjective: \"I'd really like to go to the bathroom and for a walk\" Pt found laying in bed. Pt agreeable to OOB/OT eval and tx. Patient Currently in Pain: Yes (abdomen, unrated, RN aware)    Social/Functional History  Social/Functional History  Lives With: Alone  Type of Home: House  Home Layout: One level  Home Access: Stairs to enter without rails  Entrance Stairs - Number of Steps: 1 JUAN - cart she holds onto  [employer] Shower/Tub: Walk-in shower  Bathroom Toilet: Standard (RTS above the toilet w/out hand rails)  Bathroom Equipment: Shower chair  Home Equipment: 4 wheeled walker, Sock aid, Reacher  Receives Help From: Family  ADL Assistance: Independent  Homemaking Assistance: Independent  Homemaking Responsibilities: Yes  Ambulation Assistance: Independent (w/ rollator)  Transfer Assistance: Independent  Active : No  Patient's  Info: son helps w/ groceries  Occupation: Retired  Type of occupation: public [employer] [place] Tim: [employer]  Additional Comments: Pt reports no recent falls       Objective  Treatment included functional transfer training, ADL's and pt. education.     Vision: Within Functional Limits  Hearing: Within functional limits    Orientation  Overall Orientation Status: Within Functional Limits     Balance  Sitting Balance: Independent  Standing Balance: Stand by assistance (SBA initially progressing to supervision)  Standing Balance  Time: ~ 10-12 mins total  Activity: functional transfers/functional mobility/oral care  Comment: use of rollater  Functional Mobility  Functional - Mobility Device: 4-Wheeled Walker  Activity: To/from bathroom; Other (around the room)  Assist Level: Stand by assistance (SBA initially progressing to supervision)  Functional Mobility Comments: Pt reports she was previously walking 3x per day for 45 mins  Toilet Transfers  Toilet - Technique: Ambulating  Equipment Used: Standard toilet  Toilet Transfer: Stand by assistance  Toilet Transfers Comments: rollater  ADL  Grooming: Stand by assistance;Supervision (oral care standing at the sink)  LE Dressing: Supervision (use of sock aid and reacher to don and doff socks)  Toileting: Stand by assistance  Additional Comments: pt able to complete ADLs while maintaining spinal precautions  Tone RUE  RUE Tone: Normotonic  Tone LUE  LUE Tone: Normotonic  Coordination  Movements Are Fluid And Coordinated: Yes     Bed mobility  Supine to Sit: Modified independent (HOB raised)  Transfers  Sit to stand: Stand by assistance (SBA progressing to supervision)  Stand to sit: Supervision  Transfer Comments: rollater     Cognition  Overall Cognitive Status: WFL      LUE AROM (degrees)  LUE AROM : WFL  Left Hand AROM (degrees)  Left Hand AROM: WFL  RUE AROM (degrees)  RUE AROM : WFL  Right Hand AROM (degrees)  Right Hand AROM: WFL  LUE Strength  Gross LUE Strength: WFL  RUE Strength  Gross RUE Strength: WFL    AM-PAC Score  AM-[facility] Inpatient Daily Activity Raw Score: 23 (06/28/21 1003)  AM-PAC Inpatient ADL T-Scale Score : 51.12 (06/28/21 1003)  ADL Inpatient CMS 0-100% Score: 15.86 (06/28/21 1003)  ADL Inpatient CMS G-Code Modifier : CI (06/28/21 1003)    Therapy Time   Individual Concurrent Group Co-treatment   Time In 0825         Time Out 0851         Minutes 26              Timed Code Treatment Minutes:   13 mins    Total Treatment Minutes:  26 mins      Homa Liagn, Student Occupational Therapist

## 2021-06-28 NOTE — PROGRESS NOTES
Patient complains of tenderness to PIV in L FA. Redness noted at site of PIV. PIV to L FA removed and new PIV placed to R AC. Patient tolerated well.

## 2021-06-28 NOTE — CARE COORDINATION
Resumption of Care    Patient currently active with [facility] prior to admission. Orders faxed to [facility] for Resumption of Care.    Electronically signed by Román Schrader LPN on 6/92/1597 at 2:01 PM

## 2021-06-28 NOTE — PROGRESS NOTES
Surgery Daily Progress Note      CC: Post-operative wound infection    SUBJECTIVE:  No acute events overnight. Complaining of some abdominal pain this morning, improved with pain medication. Tolerating diet without nausea/vomiting. No further complaints. ROS:   A 14 point review of systems was conducted, significant findings as noted above. All other systems negative. OBJECTIVE:    PHYSICAL EXAM:  Vitals:    06/27/21 1500 06/27/21 1925 06/27/21 2332 06/28/21 0307   BP: 125/75 (!) 152/81 117/70 128/75   Pulse: 72 91 78 63   Resp: 16 16 16 16   Temp: 98.5 °F (36.9 °C) 98 °F (36.7 °C) 98.1 °F (36.7 °C) 97.7 °F (36.5 °C)   TempSrc: Oral Oral Oral Oral   SpO2: 94% 95% 96% 95%   Weight:       Height:           General appearance: Alert, no acute distress, well-developed, well-nourished  HEENT: Normocephalic, extraocular movements grossly intact, moist mucous membranes  Chest/Lungs: normal inspiratory effort, symmetric chest rise, no accessory muscle use  Cardiovascular: Regular rate and rhythm  Abdomen: Obese, soft; midline laparotomy wound with cruciate incision at superior most aspect with improving surrounding erythema and scant serosanguinous drainage, flushed and repacked with iodoform packing  Neuro: A&Ox3, no gross motor or sensory neuro deficits  Extremities: no edema, no cyanosis      ASSESSMENT & PLAN:   Raj Bell is a 70 y.o. female with L3-4,L4-5 ALIF done 5/26 for whom our service has been consulted for aid in management of her post-operative surgical site infection. I/D done 6/27.    - Continue BID packing changes  - Continue IV Vancomycin; continue recommendations per ID service  - Continue Bactroban x5 days; MRSA cultures from 5/26  - Wound cultures from 6/27 I/D continue NGTD  -  consulted for Herbert Machado for dressing changes    Mattie Mathew MD, PGY-1  06/28/21  6:26 AM  [phone]    Discussed management with the resident.  I reviewed the resident's note and agree with the documented findings and plan of care.     Jorge Martinez M.D.  6/28/21   1:19 PM

## 2021-06-29 LAB
GRAM STAIN RESULT: ABNORMAL
ORGANISM: ABNORMAL
WOUND/ABSCESS: ABNORMAL

## 2021-06-29 NOTE — DISCHARGE SUMMARY
Hospitalist Discharge Summary    Patient ID:  Tatyana Germain  [id number]  44 y.o.  1945    Admit date: 6/26/2021    Discharge date: 6/28/2021    Disposition: home    Admission Diagnoses:   Patient Active Problem List   Diagnosis    Lumbar stenosis with neurogenic claudication    S/P lumbar fusion    Surgical site infection    Cellulitis of abdominal wall    MRSA infection       Discharge Diagnoses: Active Problems:    Surgical site infection    Cellulitis of abdominal wall    MRSA infection  Resolved Problems:    * No resolved hospital problems. *      Code Status:  Prior    Condition:  Stable    Discharge Diet: Diet:  No diet orders on file    PCP to do list: Follow-up for improvement of symptoms    Hospital Course: As per HPI:75 y.o. female with past medical history as below, who underwent lumbar surgery on 5/26/21, presents with worsening erythema around the umbilicus incision that she first noticed about 10 days ago. She was started on Bactrim for a couple days and then switched to Zyvox on Thursday. The area is tender to the touch. The erythema just hasn't improved and she continues to have small amounts of pus that drain. No fevers, nausea, vomiting, chills or other new symptoms. She has been well otherwise post-op and walking around every day. Patient was admitted to the hospital due to concern for postoperative wound infection. ID, general surgery and neurosurgery were consulted. Patient was started on IV antibiotics. Patient underwent I&D with general surgery for the surgical site infection following her L3-4,L4-5 ALIF. While in the hospital her packing was changed routinely. She was continued on IV vancomycin. Cultures came back positive for MRSA. Once her symptoms had improved significantly she was discharged home to complete course of linezolid and then Bactrim DS as per ID. She was discharged home in stable condition.     Discharge Medications:   Discharge Reason for Stopping:         ondansetron (ZOFRAN-ODT) 4 MG disintegrating tablet Comments:   Reason for Stopping:                   Procedures: I&D    Assessment on Discharge: Stable, improved     Discharge ROS:  A complete review of systems was asked and negative except for none    Discharge Exam:  /76   Pulse 78   Temp 98.3 °F (36.8 °C) (Oral)   Resp 16   Ht 5' 6\" (1.676 m)   Wt 209 lb (94.8 kg)   SpO2 96%   BMI 33.73 kg/m²     Gen: NAD  HEENT: NC/AT, moist mucous membranes, no oropharyngeal erythema or exudate  Neck: supple, trachea midline, no anterior cervical or SC LAD  Heart:  Normal s1/s2, RRR, no murmurs, gallops, or rubs. No leg edema  Lungs:  Clear to auscultation bilaterally, no use of accessory muscles  Abd:  Obese, soft; midline laparotomy wound with cruciate incision at superior most aspect with improving surrounding erythema and scant serosanguinous drainage, packed with iodoform packing  Extrem:  No clubbing, cyanosis, no edema  Skin: no lesion or masses  Psych:  A & O x3  Neuro: grossly intact, moves all four extremities    Pertinent Studies During Hospital Stay:  Radiology:  CT ABDOMEN PELVIS W IV CONTRAST Additional Contrast? None    Result Date: 6/26/2021  EXAM: CT ABDOMEN AND PELVIS WITH CONTRAST INDICATION: infection to incision site superior to umbilicus, concern for underlying abscess COMPARISON: 5/27/2021. TECHNIQUE: Axial CT imaging obtained from lung bases through pelvis. Axial images and multiplanar reformatted images are provided for review. Individualized dose optimization technique was used in order to meet ALARA standards for radiation dose reduction. In addition to vendor specific dose reduction algorithms, the dose reduction techniques vary based on the specific scanner utilized but frequently include automated exposure control, adjustment of the mA and/or kV according to patient size, and use of iterative reconstruction technique.  IV Contrast: 80 mL Isovue-370 FINDINGS: LUNG BASES: Unremarkable. LIVER: Unremarkable. GALLBLADDER AND BILIARY TREE: No calcified gallstones. No gallbladder distention. No intra- or extrahepatic biliary dilatation. PANCREAS: Unremarkable. SPLEEN: Unremarkable. ADRENAL GLANDS: Unremarkable. KIDNEYS AND URETERS: New mild bilateral hydronephrosis. No radiopaque renal or ureteral calculi visualized. URINARY BLADDER: Unremarkable. REPRODUCTIVE ORGANS: Unremarkable. BOWEL: Normal caliber. Normal appendix. LYMPH NODES: No abnormally enlarged nodes. PERITONEUM/RETROPERITONEUM: No ascites or free air. VESSELS: Aorta is normal caliber and proximal branch vessels are patent. ABDOMINAL WALL: Prior laparotomy with associated inflammatory changes an cutaneous thickening but no focal fluid collection. BONES: No suspicious osseous lesions. Lower lumbar spinal fusion hardware noted. Prior laparotomy with associated inflammatory changes and cutaneous thickening but no focal fluid collection. New mild bilateral hydronephrosis. No radiopaque renal or ureteral calculi visualized. Last Labs on Discharge:     No results found for this or any previous visit (from the past 24 hour(s)). Follow up: with Stella Dyer    Note that over 30 minutes was spent in preparing discharge papers, discussing discharge with patient, medication review, etc.    Thank you Stella Dyer for the opportunity to be involved in this patient's care. If you have any questions or concerns please feel free to contact me at [phone].     Electronically signed by Lennox Lai, MD on 6/29/2021 at 1:34 PM

## 2021-06-30 LAB
BLOOD CULTURE, ROUTINE: NORMAL
CULTURE, BLOOD 2: NORMAL

## 2021-07-01 ENCOUNTER — TELEPHONE (OUTPATIENT)
Dept: INFECTIOUS DISEASES | Age: 76
End: 2021-07-01

## 2021-07-01 NOTE — TELEPHONE ENCOUNTER
Pt called in, states has appt scheduled with Dr. Fam Diallo. States incision is improving, home health nurse has been coming to do dressing changes twice a week. She has been able to learn how to change them at home, changes them twice daily. Still has some drainage, not much. Red and yellowish in color, states she uses packing and thinks that's where the yellow comes from. Denies fevers, chills, night sweats. Finished course of linezolid this morning, starts bactrim this evening.

## 2021-07-02 LAB
ANAEROBIC CULTURE: ABNORMAL
GRAM STAIN RESULT: ABNORMAL
ORGANISM: ABNORMAL
WOUND/ABSCESS: ABNORMAL

## 2025-06-19 NOTE — ED NOTES
Orders:    omeprazole (PriLOSEC) 20 mg delayed release capsule; Take 1 capsule (20 mg total) by mouth daily before breakfast     Lab called to draw 2nd blood culture     Valerie Tovar RN  06/26/21 [id number]

## (undated) DEVICE — GLOVE SURG SZ 75 L12IN FNGR THK87MIL DK GRN LTX FREE ISOLEX

## (undated) DEVICE — KIT POS W/ FOAM ARM CRADL SHEARGUARD CHST PD CVR FOR SPNL

## (undated) DEVICE — CABLE BPLR L12FT FLYING LD DISPOSABLE

## (undated) DEVICE — SUTURE VCRL SZ 0 L18IN ABSRB UD L36MM CT-1 1/2 CIR J840D

## (undated) DEVICE — SHEET, T, LAPAROTOMY, STERILE: Brand: MEDLINE

## (undated) DEVICE — JEWISH HOSPITAL TURNOVER KIT: Brand: MEDLINE INDUSTRIES, INC.

## (undated) DEVICE — SPONGE,NEURO,0.5"X3",XR,STRL,LF,10/PK: Brand: MEDLINE

## (undated) DEVICE — APPLICATOR PREP 26ML 0.7% IOD POVACRYLEX 74% ISO ALC ST

## (undated) DEVICE — SUTURE VCRL SZ 2-0 L18IN ABSRB UD CT-1 L36MM 1/2 CIR J839D

## (undated) DEVICE — DRAPE 33X23IN INCISE ANTIMICROB IOBAN 2

## (undated) DEVICE — CODMAN® SURGICAL PATTIES 1/2" X 1" (1.27CM X 2.54CM): Brand: CODMAN®

## (undated) DEVICE — SURE SET-DOUBLE BASIN-LF: Brand: MEDLINE INDUSTRIES, INC.

## (undated) DEVICE — BLADE ES L4IN INSUL EDGE

## (undated) DEVICE — SUTURE PDS II SZ 0 L18IN ABSRB VLT L36MM CT-1 1/2 CIR Z740D

## (undated) DEVICE — GLOVE SURG SZ 75 CRM LTX FREE POLYISOPRENE POLYMER BEAD ANTI

## (undated) DEVICE — TRAY CATHETER 16FR F INCLUDE BARDX IC COMPLT CARE DRNGE BG

## (undated) DEVICE — APPLIER LIG CLP M L11IN TI STR RNG HNDL FOR 20 CLP DISP

## (undated) DEVICE — SPONGE GZ W4XL4IN COT 12 PLY TYP VII WVN C FLD DSGN

## (undated) DEVICE — CODMAN® SURGICAL PATTIES 1/2" X 1/2" (1.27CM X 1.27CM): Brand: CODMAN®

## (undated) DEVICE — Device

## (undated) DEVICE — 3M™ BAIR HUGGER™ MULTI-POSITION UPPER BODY BLANKET, 10 PER CASE, 62200: Brand: BAIR HUGGER™

## (undated) DEVICE — APPLIER CLP L9.375IN APER 2.1MM CLS L3.8MM 20 SM TI CLP

## (undated) DEVICE — SUTURE PERMAHAND SZ 2-0 L30IN NONABSORBABLE BLK SILK W/O A305H

## (undated) DEVICE — SUTURE VCRL SZ 3-0 L36IN ABSRB UD L36MM CT-1 1/2 CIR J944H

## (undated) DEVICE — SUTURE VCRL SZ 3-0 L27IN ABSRB UD L36MM CT-1 1/2 CIR J258H

## (undated) DEVICE — STYLET SURG VIPER PRIM

## (undated) DEVICE — SPONGE,PEANUT,XRAY,ST,SM,3/8",5/CARD: Brand: MEDLINE INDUSTRIES, INC.

## (undated) DEVICE — ADHESIVE SKIN CLSR 0.7ML TOP DERMBND ADV

## (undated) DEVICE — INTENDED FOR TISSUE SEPARATION, AND OTHER PROCEDURES THAT REQUIRE A SHARP SURGICAL BLADE TO PUNCTURE OR CUT.: Brand: BARD-PARKER ® CARBON RIB-BACK BLADES

## (undated) DEVICE — TUBING, SUCTION, 1/4" X 12', STRAIGHT: Brand: MEDLINE

## (undated) DEVICE — ELECTROSURGICAL PENCIL ROCKER SWITCH NON COATED BLADE ELECTRODE 10 FT (3 M) CORD HOLSTER: Brand: MEGADYNE

## (undated) DEVICE — GLOVE SURG SZ 85 L12IN FNGR ORTHO 126MIL CRM LTX FREE

## (undated) DEVICE — SUTURE MCRYL SZ 4-0 L27IN ABSRB UD L19MM PS-2 1/2 CIR PRIM Y426H

## (undated) DEVICE — COVER,TABLE,HEAVY DUTY,77"X90",STRL: Brand: MEDLINE

## (undated) DEVICE — PLATE ES AD W 9FT CRD 2

## (undated) DEVICE — SYRINGE IRRIG 60ML SFT PLIABLE BLB EZ TO GRP 1 HND USE W/

## (undated) DEVICE — GLOVE SURG SZ 85 L12IN FNGR THK87MIL DK GRN LTX POLYMER W

## (undated) DEVICE — COVER LT HNDL BLU PLAS

## (undated) DEVICE — CERAMIC KERRISON SIDE EJECT 3MM 40° FWD: Brand: LIFE INSTRUMENTS

## (undated) DEVICE — BLADE SURG NO15 S STL STR DISP GLASSVAN

## (undated) DEVICE — Z DUP USE 2701075 SYSTEM SKIN CLSR 42CM DERMBND PRINEO

## (undated) DEVICE — BONE MARROW HARVEST NEEDLE 11GA X 4IN: Brand: BONE MARROW HARVEST NEEDLE

## (undated) DEVICE — GARMENT,MEDLINE,DVT,INT,CALF,MED, GEN2: Brand: MEDLINE

## (undated) DEVICE — E-Z CLEAN, NON-STICK, PTFE COATED, ELECTROSURGICAL BLADE ELECTRODE, 6.5 INCH (16.5 CM): Brand: MEGADYNE

## (undated) DEVICE — GLOVE SURG SZ 7 L12IN FNGR THK79MIL GRN LTX FREE

## (undated) DEVICE — SUTURE PDS II SZ 0 L60IN ABSRB VLT L48MM CTX 1/2 CIR Z990G

## (undated) DEVICE — PROBE 8225101 5PK STD PRASS FL TIP ROHS

## (undated) DEVICE — LAMINECTOMY PK

## (undated) DEVICE — INTENDED USE FOR SURGICAL MARKING ON INTACT SKIN, ALSO PROVIDES A PERMANENT METHOD OF IDENTIFYING OBJECTS IN THE OPERATING ROOM: Brand: WRITESITE® PLUS MINI PREP RESISTANT MARKER

## (undated) DEVICE — CERAMIC KERRISON SIDE-EJECT 2MM 40° FWD: Brand: LIFE INSTRUMENTS

## (undated) DEVICE — PACK,UNIVERSAL,NO GOWNS: Brand: MEDLINE

## (undated) DEVICE — SPONGE,LAP,18"X18",DLX,XR,ST,5/PK,40/PK: Brand: MEDLINE

## (undated) DEVICE — E-Z CLEAN, NON-STICK, PTFE COATED, ELECTROSURGICAL BLADE ELECTRODE, MODIFIED EXTENDED INSULATION, 2.5 INCH (6.35 CM): Brand: MEGADYNE

## (undated) DEVICE — COVER,MAYO STAND,XL,STERILE: Brand: MEDLINE

## (undated) DEVICE — SHEET,DRAPE,53X77,STERILE: Brand: MEDLINE

## (undated) DEVICE — C-ARM: Brand: UNBRANDED